# Patient Record
Sex: MALE | Race: WHITE | NOT HISPANIC OR LATINO | URBAN - METROPOLITAN AREA
[De-identification: names, ages, dates, MRNs, and addresses within clinical notes are randomized per-mention and may not be internally consistent; named-entity substitution may affect disease eponyms.]

---

## 2017-04-17 ENCOUNTER — OUTPATIENT (OUTPATIENT)
Dept: OUTPATIENT SERVICES | Facility: HOSPITAL | Age: 62
LOS: 1 days | End: 2017-04-17

## 2017-07-02 ENCOUNTER — EMERGENCY (EMERGENCY)
Facility: HOSPITAL | Age: 62
LOS: 1 days | End: 2017-07-02
Payer: MEDICARE

## 2017-07-02 PROCEDURE — 99284 EMERGENCY DEPT VISIT MOD MDM: CPT

## 2023-04-25 ENCOUNTER — APPOINTMENT (OUTPATIENT)
Dept: CARDIOLOGY | Facility: CLINIC | Age: 68
End: 2023-04-25
Payer: MEDICARE

## 2023-04-25 ENCOUNTER — NON-APPOINTMENT (OUTPATIENT)
Age: 68
End: 2023-04-25

## 2023-04-25 VITALS — DIASTOLIC BLOOD PRESSURE: 70 MMHG | SYSTOLIC BLOOD PRESSURE: 128 MMHG

## 2023-04-25 VITALS
HEART RATE: 65 BPM | BODY MASS INDEX: 31.33 KG/M2 | SYSTOLIC BLOOD PRESSURE: 130 MMHG | WEIGHT: 252 LBS | DIASTOLIC BLOOD PRESSURE: 74 MMHG | HEIGHT: 75 IN | OXYGEN SATURATION: 95 %

## 2023-04-25 DIAGNOSIS — Z83.438 FAMILY HISTORY OF OTHER DISORDER OF LIPOPROTEIN METABOLISM AND OTHER LIPIDEMIA: ICD-10-CM

## 2023-04-25 DIAGNOSIS — T46.6X5A MYALGIA, UNSPECIFIED SITE: ICD-10-CM

## 2023-04-25 DIAGNOSIS — Z82.49 FAMILY HISTORY OF ISCHEMIC HEART DISEASE AND OTHER DISEASES OF THE CIRCULATORY SYSTEM: ICD-10-CM

## 2023-04-25 DIAGNOSIS — Z53.09 PROCEDURE AND TREATMENT NOT CARRIED OUT BECAUSE OF OTHER CONTRAINDICATION: ICD-10-CM

## 2023-04-25 DIAGNOSIS — Z86.16 PERSONAL HISTORY OF COVID-19: ICD-10-CM

## 2023-04-25 DIAGNOSIS — M79.10 MYALGIA, UNSPECIFIED SITE: ICD-10-CM

## 2023-04-25 DIAGNOSIS — S46.219A STRAIN OF MUSCLE, FASCIA AND TENDON OF OTHER PARTS OF BICEPS, UNSPECIFIED ARM, INITIAL ENCOUNTER: ICD-10-CM

## 2023-04-25 DIAGNOSIS — S86.019A STRAIN OF UNSPECIFIED ACHILLES TENDON, INITIAL ENCOUNTER: ICD-10-CM

## 2023-04-25 DIAGNOSIS — Z92.29 PERSONAL HISTORY OF OTHER DRUG THERAPY: ICD-10-CM

## 2023-04-25 DIAGNOSIS — Z78.9 OTHER SPECIFIED HEALTH STATUS: ICD-10-CM

## 2023-04-25 PROCEDURE — 99215 OFFICE O/P EST HI 40 MIN: CPT

## 2023-04-25 PROCEDURE — 93000 ELECTROCARDIOGRAM COMPLETE: CPT

## 2023-04-25 RX ORDER — ATORVASTATIN CALCIUM 80 MG/1
80 TABLET, FILM COATED ORAL
Refills: 0 | Status: DISCONTINUED | COMMUNITY
Start: 2023-04-25 | End: 2023-04-25

## 2023-04-25 RX ORDER — ASPIRIN ENTERIC COATED TABLETS 81 MG 81 MG/1
81 TABLET, DELAYED RELEASE ORAL DAILY
Qty: 90 | Refills: 3 | Status: ACTIVE | COMMUNITY
Start: 2023-04-25

## 2023-04-25 RX ORDER — RELUGOLIX 120 MG/1
120 TABLET, FILM COATED ORAL
Refills: 0 | Status: ACTIVE | COMMUNITY
Start: 2023-04-25

## 2023-04-25 NOTE — REASON FOR VISIT
[Hyperlipidemia] : hyperlipidemia [Hypertension] : hypertension [Coronary Artery Disease] : coronary artery disease [FreeTextEntry1] : I saw this 67-year-old man in follow-up consultation on  04/25/23\par He presented to the hospital on 04/21/23 with an anterior wall myocardial infarction and had stenting of a complex LAD diagonal lesion.  He had a reduced EF, had 48 hours of reperfusion arrhythmias, but seems to have settled down on beta-blockers aspirin statins Brilinta and aspirin.\par He comes in for follow-up with no complaints no groin issues.

## 2023-04-25 NOTE — DISCUSSION/SUMMARY
[FreeTextEntry1] : He will remain on all of his medications, aspirin Brilinta beta-blockers statins.  Because of his past history of statin intolerance I will start the process for prescribing Repatha.  We will follow-up in 1 month.

## 2023-04-25 NOTE — PHYSICAL EXAM
[Well Developed] : well developed [Well Nourished] : well nourished [No Acute Distress] : no acute distress [Normal Conjunctiva] : normal conjunctiva [Normal Venous Pressure] : normal venous pressure [No Carotid Bruit] : no carotid bruit [Normal S1, S2] : normal S1, S2 [No Murmur] : no murmur [No Rub] : no rub [No Gallop] : no gallop [Clear Lung Fields] : clear lung fields [Good Air Entry] : good air entry [No Respiratory Distress] : no respiratory distress  [Soft] : abdomen soft [Non Tender] : non-tender [No Masses/organomegaly] : no masses/organomegaly [Normal Bowel Sounds] : normal bowel sounds [Normal Gait] : normal gait [No Edema] : no edema [No Cyanosis] : no cyanosis [No Clubbing] : no clubbing [No Varicosities] : no varicosities [No Rash] : no rash [No Skin Lesions] : no skin lesions [Moves all extremities] : moves all extremities [No Focal Deficits] : no focal deficits [Normal Speech] : normal speech [Alert and Oriented] : alert and oriented [Normal] : alert and oriented, normal memory [Normal memory] : normal memory

## 2023-04-27 ENCOUNTER — NON-APPOINTMENT (OUTPATIENT)
Age: 68
End: 2023-04-27

## 2023-05-02 ENCOUNTER — NON-APPOINTMENT (OUTPATIENT)
Age: 68
End: 2023-05-02

## 2023-05-03 ENCOUNTER — TRANSCRIPTION ENCOUNTER (OUTPATIENT)
Age: 68
End: 2023-05-03

## 2023-05-04 ENCOUNTER — NON-APPOINTMENT (OUTPATIENT)
Age: 68
End: 2023-05-04

## 2023-05-09 ENCOUNTER — APPOINTMENT (OUTPATIENT)
Dept: UROLOGY | Facility: CLINIC | Age: 68
End: 2023-05-09

## 2023-05-19 ENCOUNTER — NON-APPOINTMENT (OUTPATIENT)
Age: 68
End: 2023-05-19

## 2023-05-20 ENCOUNTER — TRANSCRIPTION ENCOUNTER (OUTPATIENT)
Age: 68
End: 2023-05-20

## 2023-05-22 ENCOUNTER — TRANSCRIPTION ENCOUNTER (OUTPATIENT)
Age: 68
End: 2023-05-22

## 2023-05-23 ENCOUNTER — TRANSCRIPTION ENCOUNTER (OUTPATIENT)
Age: 68
End: 2023-05-23

## 2023-05-30 ENCOUNTER — APPOINTMENT (OUTPATIENT)
Dept: CARDIOLOGY | Facility: CLINIC | Age: 68
End: 2023-05-30
Payer: MEDICARE

## 2023-05-30 VITALS
DIASTOLIC BLOOD PRESSURE: 80 MMHG | HEART RATE: 70 BPM | HEIGHT: 75 IN | SYSTOLIC BLOOD PRESSURE: 126 MMHG | OXYGEN SATURATION: 99 %

## 2023-05-30 PROCEDURE — 99215 OFFICE O/P EST HI 40 MIN: CPT

## 2023-05-30 RX ORDER — ONDANSETRON 8 MG/1
8 TABLET, ORALLY DISINTEGRATING ORAL
Qty: 15 | Refills: 0 | Status: ACTIVE | COMMUNITY
Start: 2023-05-20

## 2023-05-30 NOTE — DISCUSSION/SUMMARY
[FreeTextEntry1] : He will remain on all of his medications, aspirin Brilinta beta-blockers statins.  Because of his past history of statin intolerance I will start the process for prescribing Repatha.  which he now is taking.\par I have scheduled an echo for 2 months from now to reassess LV function and will see him a week after the echo.

## 2023-05-30 NOTE — REASON FOR VISIT
[Hyperlipidemia] : hyperlipidemia [Hypertension] : hypertension [Coronary Artery Disease] : coronary artery disease [FreeTextEntry1] : I saw this 67-year-old man in follow-up consultation on  05/30/23\par He presented to the hospital on 04/21/23 with an anterior wall myocardial infarction and had stenting of a complex LAD diagonal lesion.  He had a reduced EF, had 48 hours of reperfusion arrhythmias, but seems to have settled down on beta-blockers aspirin statins Brilinta and aspirin.\par He comes in for follow-up with no complaints no groin issues.\par However he recently had a readmission because of hematuria was transferred to [facility] for hyperbaric oxygen treatment and currently is home but still having a lot of cystitis and urinary issues.

## 2023-06-07 ENCOUNTER — NON-APPOINTMENT (OUTPATIENT)
Age: 68
End: 2023-06-07

## 2023-06-07 ENCOUNTER — APPOINTMENT (OUTPATIENT)
Dept: UROLOGY | Facility: CLINIC | Age: 68
End: 2023-06-07
Payer: MEDICARE

## 2023-06-07 VITALS
BODY MASS INDEX: 29.84 KG/M2 | DIASTOLIC BLOOD PRESSURE: 74 MMHG | HEIGHT: 75 IN | WEIGHT: 240 LBS | TEMPERATURE: 97.3 F | HEART RATE: 59 BPM | SYSTOLIC BLOOD PRESSURE: 138 MMHG

## 2023-06-07 PROCEDURE — 99213 OFFICE O/P EST LOW 20 MIN: CPT

## 2023-06-07 NOTE — HISTORY OF PRESENT ILLNESS
[FreeTextEntry1] : 67-year-old patient presented to our office to discuss the further steps of the treatment.\par It is his first visit to the urological office but I remember him from the emergent treatment in the [facility].\par Patient has a radical prostatectomy in the past and after the scans the radiation for the locally advanced cancer.\par In [facility] he was admitted because of the gross hematuria.  We did cystoscopy with clot location we found a lot of the lesions that have very characteristic for the post radiation cystitis.\par We treated those lesions with the laser and after was sent patient for the [facility] for the hyperoxygenation chamber\par Today his main compliance on urgency frequency and burning sensation on urination.\par Patient recently had a suspect for the urinary tract infection and was treated with the Cipro 500 mg twice a day.

## 2023-06-07 NOTE — PHYSICAL EXAM
[General Appearance - Well Developed] : well developed [General Appearance - Well Nourished] : well nourished [Normal Appearance] : normal appearance [Well Groomed] : well groomed [General Appearance - In No Acute Distress] : no acute distress [Edema] : no peripheral edema [Respiration, Rhythm And Depth] : normal respiratory rhythm and effort [Exaggerated Use Of Accessory Muscles For Inspiration] : no accessory muscle use [Abdomen Soft] : soft [Abdomen Tenderness] : non-tender [Costovertebral Angle Tenderness] : no ~M costovertebral angle tenderness [Urethral Meatus] : meatus normal [Urinary Bladder Findings] : the bladder was normal on palpation [Scrotum] : the scrotum was normal [Testes Mass (___cm)] : there were no testicular masses [Normal Station and Gait] : the gait and station were normal for the patient's age [No Focal Deficits] : no focal deficits [] : no rash [Oriented To Time, Place, And Person] : oriented to person, place, and time [Affect] : the affect was normal [Mood] : the mood was normal [Not Anxious] : not anxious [No Palpable Adenopathy] : no palpable adenopathy

## 2023-06-07 NOTE — ASSESSMENT
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  See patients in 4 weeks.

## 2023-06-22 ENCOUNTER — APPOINTMENT (OUTPATIENT)
Dept: UROLOGY | Facility: CLINIC | Age: 68
End: 2023-06-22
Payer: MEDICARE

## 2023-06-22 VITALS
DIASTOLIC BLOOD PRESSURE: 69 MMHG | HEIGHT: 75 IN | BODY MASS INDEX: 29.84 KG/M2 | RESPIRATION RATE: 16 BRPM | TEMPERATURE: 98.1 F | HEART RATE: 73 BPM | WEIGHT: 240 LBS | SYSTOLIC BLOOD PRESSURE: 107 MMHG

## 2023-06-22 DIAGNOSIS — Z97.8 PRESENCE OF OTHER SPECIFIED DEVICES: ICD-10-CM

## 2023-06-22 DIAGNOSIS — Z87.898 PERSONAL HISTORY OF OTHER SPECIFIED CONDITIONS: ICD-10-CM

## 2023-06-22 PROCEDURE — 99212 OFFICE O/P EST SF 10 MIN: CPT

## 2023-06-22 NOTE — HISTORY OF PRESENT ILLNESS
[FreeTextEntry1] : 67-year-old patient presented with a Tate catheter. \par We know patient from the previous visits. \par I saw patient in the hospital and did that cystoscopy and laser ablation of the bleeding lesions.  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  The Tate catheter was inserted and today he presented with the Tate.

## 2023-06-22 NOTE — ASSESSMENT
[FreeTextEntry1] : Discussed with the patient's option to take out the Tate catheter today.\par He preferred to stay with the Tate cath until Monday because of the severe urge incontinence when the catheter is out\par See him on Monday

## 2023-06-22 NOTE — PHYSICAL EXAM
[General Appearance - Well Developed] : well developed [General Appearance - Well Nourished] : well nourished [Normal Appearance] : normal appearance [Well Groomed] : well groomed [General Appearance - In No Acute Distress] : no acute distress [Abdomen Soft] : soft [Abdomen Tenderness] : non-tender [Costovertebral Angle Tenderness] : no ~M costovertebral angle tenderness [Urethral Meatus] : meatus normal [Urinary Bladder Findings] : the bladder was normal on palpation [Scrotum] : the scrotum was normal [Testes Mass (___cm)] : there were no testicular masses [FreeTextEntry1] : Tate cath in the bladder draining the yellow urine [Edema] : no peripheral edema [] : no respiratory distress [Respiration, Rhythm And Depth] : normal respiratory rhythm and effort [Exaggerated Use Of Accessory Muscles For Inspiration] : no accessory muscle use [Oriented To Time, Place, And Person] : oriented to person, place, and time [Affect] : the affect was normal [Mood] : the mood was normal [Not Anxious] : not anxious [Normal Station and Gait] : the gait and station were normal for the patient's age [No Focal Deficits] : no focal deficits [No Palpable Adenopathy] : no palpable adenopathy

## 2023-06-26 ENCOUNTER — APPOINTMENT (OUTPATIENT)
Dept: UROLOGY | Facility: CLINIC | Age: 68
End: 2023-06-26

## 2023-07-08 ENCOUNTER — INPATIENT (INPATIENT)
Facility: HOSPITAL | Age: 68
LOS: 5 days | Discharge: HOME | DRG: 669 | End: 2023-07-14
Attending: STUDENT IN AN ORGANIZED HEALTH CARE EDUCATION/TRAINING PROGRAM | Admitting: STUDENT IN AN ORGANIZED HEALTH CARE EDUCATION/TRAINING PROGRAM
Payer: MEDICARE

## 2023-07-08 VITALS
SYSTOLIC BLOOD PRESSURE: 121 MMHG | DIASTOLIC BLOOD PRESSURE: 79 MMHG | TEMPERATURE: 98 F | OXYGEN SATURATION: 100 % | RESPIRATION RATE: 19 BRPM | HEART RATE: 65 BPM

## 2023-07-08 DIAGNOSIS — R31.9 HEMATURIA, UNSPECIFIED: ICD-10-CM

## 2023-07-08 DIAGNOSIS — I25.10 ATHEROSCLEROTIC HEART DISEASE OF NATIVE CORONARY ARTERY WITHOUT ANGINA PECTORIS: ICD-10-CM

## 2023-07-08 DIAGNOSIS — Z90.49 ACQUIRED ABSENCE OF OTHER SPECIFIED PARTS OF DIGESTIVE TRACT: Chronic | ICD-10-CM

## 2023-07-08 DIAGNOSIS — Z98.890 OTHER SPECIFIED POSTPROCEDURAL STATES: Chronic | ICD-10-CM

## 2023-07-08 DIAGNOSIS — D62 ACUTE POSTHEMORRHAGIC ANEMIA: ICD-10-CM

## 2023-07-08 DIAGNOSIS — I10 ESSENTIAL (PRIMARY) HYPERTENSION: ICD-10-CM

## 2023-07-08 DIAGNOSIS — N30.40 IRRADIATION CYSTITIS WITHOUT HEMATURIA: ICD-10-CM

## 2023-07-08 LAB
ALBUMIN SERPL ELPH-MCNC: 4 G/DL — SIGNIFICANT CHANGE UP (ref 3.3–5.2)
ALP SERPL-CCNC: 124 U/L — HIGH (ref 40–120)
ALT FLD-CCNC: 20 U/L — SIGNIFICANT CHANGE UP
ANION GAP SERPL CALC-SCNC: 14 MMOL/L — SIGNIFICANT CHANGE UP (ref 5–17)
AST SERPL-CCNC: 23 U/L — SIGNIFICANT CHANGE UP
BILIRUB SERPL-MCNC: 0.5 MG/DL — SIGNIFICANT CHANGE UP (ref 0.4–2)
BUN SERPL-MCNC: 16.9 MG/DL — SIGNIFICANT CHANGE UP (ref 8–20)
CALCIUM SERPL-MCNC: 9.5 MG/DL — SIGNIFICANT CHANGE UP (ref 8.4–10.5)
CHLORIDE SERPL-SCNC: 101 MMOL/L — SIGNIFICANT CHANGE UP (ref 96–108)
CO2 SERPL-SCNC: 20 MMOL/L — LOW (ref 22–29)
CREAT SERPL-MCNC: 1.03 MG/DL — SIGNIFICANT CHANGE UP (ref 0.5–1.3)
EGFR: 80 ML/MIN/1.73M2 — SIGNIFICANT CHANGE UP
GLUCOSE SERPL-MCNC: 110 MG/DL — HIGH (ref 70–99)
HCT VFR BLD CALC: 36.4 % — LOW (ref 39–50)
HGB BLD-MCNC: 12.3 G/DL — LOW (ref 13–17)
INR BLD: 1.13 RATIO — SIGNIFICANT CHANGE UP (ref 0.88–1.16)
MCHC RBC-ENTMCNC: 31.6 PG — SIGNIFICANT CHANGE UP (ref 27–34)
MCHC RBC-ENTMCNC: 33.8 GM/DL — SIGNIFICANT CHANGE UP (ref 32–36)
MCV RBC AUTO: 93.6 FL — SIGNIFICANT CHANGE UP (ref 80–100)
PLATELET # BLD AUTO: 337 K/UL — SIGNIFICANT CHANGE UP (ref 150–400)
POTASSIUM SERPL-MCNC: 4.1 MMOL/L — SIGNIFICANT CHANGE UP (ref 3.5–5.3)
POTASSIUM SERPL-SCNC: 4.1 MMOL/L — SIGNIFICANT CHANGE UP (ref 3.5–5.3)
PROT SERPL-MCNC: 6.6 G/DL — SIGNIFICANT CHANGE UP (ref 6.6–8.7)
PROTHROM AB SERPL-ACNC: 13.1 SEC — SIGNIFICANT CHANGE UP (ref 10.5–13.4)
RBC # BLD: 3.89 M/UL — LOW (ref 4.2–5.8)
RBC # FLD: 13.2 % — SIGNIFICANT CHANGE UP (ref 10.3–14.5)
SODIUM SERPL-SCNC: 135 MMOL/L — SIGNIFICANT CHANGE UP (ref 135–145)
WBC # BLD: 8.96 K/UL — SIGNIFICANT CHANGE UP (ref 3.8–10.5)
WBC # FLD AUTO: 8.96 K/UL — SIGNIFICANT CHANGE UP (ref 3.8–10.5)

## 2023-07-08 PROCEDURE — G0316 PROLONG INPT EVAL ADD15 M: CPT

## 2023-07-08 PROCEDURE — 99223 1ST HOSP IP/OBS HIGH 75: CPT

## 2023-07-08 RX ORDER — GABAPENTIN 400 MG/1
1 CAPSULE ORAL
Refills: 0 | DISCHARGE

## 2023-07-08 RX ORDER — TICAGRELOR 90 MG/1
60 TABLET ORAL EVERY 12 HOURS
Refills: 0 | Status: DISCONTINUED | OUTPATIENT
Start: 2023-07-08 | End: 2023-07-14

## 2023-07-08 RX ORDER — ASPIRIN/CALCIUM CARB/MAGNESIUM 324 MG
81 TABLET ORAL DAILY
Refills: 0 | Status: DISCONTINUED | OUTPATIENT
Start: 2023-07-08 | End: 2023-07-14

## 2023-07-08 RX ORDER — LOSARTAN POTASSIUM 100 MG/1
25 TABLET, FILM COATED ORAL DAILY
Refills: 0 | Status: DISCONTINUED | OUTPATIENT
Start: 2023-07-08 | End: 2023-07-14

## 2023-07-08 RX ORDER — ASPIRIN/CALCIUM CARB/MAGNESIUM 324 MG
1 TABLET ORAL
Refills: 0 | DISCHARGE

## 2023-07-08 RX ORDER — LOSARTAN POTASSIUM 100 MG/1
1 TABLET, FILM COATED ORAL
Refills: 0 | DISCHARGE

## 2023-07-08 RX ORDER — PENTOSAN POLYSULFATE SODIUM 100 MG
1 CAPSULE ORAL
Refills: 0 | DISCHARGE

## 2023-07-08 RX ORDER — TROSPIUM CHLORIDE 20 MG/1
1 TABLET, FILM COATED ORAL
Refills: 0 | DISCHARGE

## 2023-07-08 RX ORDER — TAMSULOSIN HYDROCHLORIDE 0.4 MG/1
0.4 CAPSULE ORAL AT BEDTIME
Refills: 0 | Status: DISCONTINUED | OUTPATIENT
Start: 2023-07-08 | End: 2023-07-14

## 2023-07-08 RX ORDER — GABAPENTIN 400 MG/1
300 CAPSULE ORAL THREE TIMES A DAY
Refills: 0 | Status: DISCONTINUED | OUTPATIENT
Start: 2023-07-08 | End: 2023-07-14

## 2023-07-08 RX ORDER — SPIRONOLACTONE 25 MG/1
25 TABLET, FILM COATED ORAL DAILY
Refills: 0 | Status: DISCONTINUED | OUTPATIENT
Start: 2023-07-08 | End: 2023-07-14

## 2023-07-08 RX ORDER — METOPROLOL TARTRATE 50 MG
100 TABLET ORAL DAILY
Refills: 0 | Status: DISCONTINUED | OUTPATIENT
Start: 2023-07-08 | End: 2023-07-14

## 2023-07-08 RX ORDER — SPIRONOLACTONE 25 MG/1
1 TABLET, FILM COATED ORAL
Refills: 0 | DISCHARGE

## 2023-07-08 RX ORDER — RELUGOLIX 120 MG/1
1 TABLET, FILM COATED ORAL
Refills: 0 | DISCHARGE

## 2023-07-08 RX ADMIN — TICAGRELOR 60 MILLIGRAM(S): 90 TABLET ORAL at 17:20

## 2023-07-08 RX ADMIN — Medication 100 MILLIGRAM(S): at 15:03

## 2023-07-08 RX ADMIN — TAMSULOSIN HYDROCHLORIDE 0.4 MILLIGRAM(S): 0.4 CAPSULE ORAL at 21:37

## 2023-07-08 RX ADMIN — GABAPENTIN 300 MILLIGRAM(S): 400 CAPSULE ORAL at 15:03

## 2023-07-08 RX ADMIN — GABAPENTIN 300 MILLIGRAM(S): 400 CAPSULE ORAL at 21:37

## 2023-07-08 NOTE — PATIENT PROFILE ADULT - FALL HARM RISK - RISK INTERVENTIONS
Assistance OOB with selected safe patient handling equipment/Assistance with ambulation/Communicate Fall Risk and Risk Factors to all staff, patient, and family/Monitor for mental status changes/Monitor gait and stability/Reinforce activity limits and safety measures with patient and family/Bed in lowest position, wheels locked, appropriate side rails in place/Call bell, personal items and telephone in reach/Instruct patient to call for assistance before getting out of bed or chair/Non-slip footwear when patient is out of bed/[place] to call system/Physically safe environment - no spills, clutter or unnecessary equipment/Purposeful Proactive Rounding/Room/bathroom lighting operational, light cord in reach

## 2023-07-08 NOTE — H&P ADULT - ASSESSMENT
68 year old male with PMH HTN, CAD s/p PCI and prostate cancer s/p resection and XRT was transferred from [facility] for further care. As per patient he has been having hematuria since end April-beginning May when he presented to [facility] and then transferred to [facility] for hyperbaric Oxygen treatment. Intermittent bleeding and hospitalizations for hematuria and urinary retention. Received IV iron, no transfusions. Currently just having hematuria.  Denies any abdominal pain, nausea, vomiting, chest pain, dyspnea or palpitations      Hematuria  Radiation Cystitis  Anemia due to  blood loss  - Admit to medical bed  - Maintain Tate  - CBI ordered  - Tamsulosin  - Monitor Hgb  - Urology consulted    CAD with prior PCI (April 2023)  HTN  -  DAPT;   - BB, ARB     68 year old male with PMH HTN, CAD s/p PCI and prostate cancer s/p resection and XRT was transferred from [facility] for further care. As per patient he has been having hematuria since end April-beginning May when he presented to [facility] and then transferred to [facility] for hyperbaric Oxygen treatment. Intermittent bleeding and hospitalizations for hematuria and urinary retention. Received IV iron, no transfusions. Currently just having hematuria.  Denies any abdominal pain, nausea, vomiting, chest pain, dyspnea or palpitations      Hematuria  Radiation Cystitis  Anemia due to  blood loss  - Admit to medical bed  - Maintain Tate  - CBI ordered  - Tamsulosin  - Monitor Hgb  - Urology consulted    CAD with prior PCI (April 2023)  HTN  -  DAPT; patient had been advised to continue ASA and stop Brilinta but patient doesnt want to stop as per documentation from Cardiology at [place]  - BB, ARB    VTE Prophylaxis  - VCD    Discussed with Urology ABAD Palma   68 year old male with PMH HTN, CAD s/p PCI and prostate cancer s/p resection and XRT was transferred from [facility] for further care. As per patient he has been having hematuria since end April-beginning May when he presented to [facility] and then transferred to [facility] for hyperbaric Oxygen treatment. Intermittent bleeding and hospitalizations for hematuria and urinary retention. Received IV iron, no transfusions. Currently just having hematuria.  Denies any abdominal pain, nausea, vomiting, chest pain, dyspnea or palpitations      Hematuria  Radiation Cystitis  Anemia due to  blood loss  - Admit to medical bed  - Maintain Tate  - CBI ordered  - Tamsulosin  - Monitor Hgb; Check Iron stores  - Urology consulted    CAD with prior PCI (April 2023)  HTN  Currently asymptomatic.  - Monitor  -  DAPT; patient had been advised to continue ASA and stop Brilinta but patient doesnt want to stop as per documentation from Cardiology at [place]  - BB, ARB  - Follow up DAPT discussion with patient     VTE Prophylaxis  - VCD. No anticoagulation due to hematuria  Mobilize OOBTC  Telemetry x 24 hours then need based    May need cystoscopy inevitably. Discussed with Urology LANNY Palma   68 year old male with PMH HTN, CAD s/p PCI and prostate cancer s/p resection and XRT was transferred from [facility] for further care. As per patient he has been having hematuria since end April-beginning May when he presented to [facility] and then transferred to [facility] for hyperbaric Oxygen treatment. Intermittent bleeding and hospitalizations for hematuria and urinary retention. Received IV iron, no transfusions. Currently just having hematuria.  Denies any abdominal pain, nausea, vomiting, chest pain, dyspnea or palpitations      Hematuria  Radiation Cystitis  Anemia due to  blood loss  - Admit to medical bed  - Maintain Tate  - CBI ordered  - Trospium; patient to get own.   - Gabapentin to be continued  - Tamsulosin  - Monitor Hgb; Check Iron stores  - Urology consulted    CAD with prior PCI (April 2023)  HTN  Currently asymptomatic.  - Monitor  -  DAPT; patient had been advised to continue ASA and stop Brilinta but patient doesn't want to stop.  - BB, ARB  - Follow up DAPT discussion with patient     VTE Prophylaxis  - VCD. No anticoagulation due to hematuria  Mobilize OOBTC  Telemetry x 24 hours then need based    May need cystoscopy inevitably. Discussed with Urology LANNY Palma

## 2023-07-08 NOTE — CONSULT NOTE ADULT - NS ATTEND AMEND GEN_ALL_CORE FT
The patient had a RALP.  PSA was rising and he had radiation a year later.   He is on hormonal therapy for a couple of years.   Had 40 hyperbaric treatments Persistent gross hematuria.     Conservative management.

## 2023-07-08 NOTE — H&P ADULT - NSHPPHYSICALEXAM_GEN_ALL_CORE
OBJECTIVE:  Vital Signs Last 24 Hrs  T(C): 36.7 (08 Jul 2023 11:36), Max: 36.7 (08 Jul 2023 11:36)  T(F): 98.1 (08 Jul 2023 11:36), Max: 98.1 (08 Jul 2023 11:36)  HR: 65 (08 Jul 2023 11:36) (65 - 65)  BP: 121/79 (08 Jul 2023 11:36) (121/79 - 121/79)  BP(mean): --  RR: 19 (08 Jul 2023 11:36) (19 - 19)  SpO2: 100% (08 Jul 2023 11:36) (100% - 100%)        PHYSICAL EXAMINATION  General: Middle aged male lying in bed comfortably  HEENT:  Anicteric sclera  NECK:  Supple  CVS: regular rate and rhythm S1 S2  RESP:  CTAB  GI:  Soft nondistended nontender BS+  : Tate with bloody urine  MSK:  FROM  CNS:  Aox3. No gross focal or global deficit appreciated  INTEG:  warm dry skin  PSYCH:  Fair Mood

## 2023-07-08 NOTE — H&P ADULT - HISTORY OF PRESENT ILLNESS
68 year old male with PMH HTN, CAD s/p PCI and prostate cancer s/p resection and XRT was transferred from [facility] for further care. As per patient he has been having hematuria since end April-beginning May when he presented to [facility] and then transferred to [facility] for hyperbaric Oxygen treatment. Intermittent bleeding and hospitalizations for hematuria and urinary retention. Received IV iron, no transfusions. Currently just having hematuria.  Denies any abdominal pain, nausea, vomiting, chest pain, dyspnea or palpitations

## 2023-07-08 NOTE — H&P ADULT - NSICDXFAMILYHX_GEN_ALL_CORE_FT
FAMILY HISTORY:  Father  Still living? Unknown  FH: CAD (coronary artery disease), Age at diagnosis: Age Unknown    Mother  Still living? Unknown  FH: CAD (coronary artery disease), Age at diagnosis: Age Unknown    Sibling  Still living? Unknown  FH: CAD (coronary artery disease), Age at diagnosis: Age Unknown

## 2023-07-09 LAB
ANION GAP SERPL CALC-SCNC: 13 MMOL/L — SIGNIFICANT CHANGE UP (ref 5–17)
BUN SERPL-MCNC: 19 MG/DL — SIGNIFICANT CHANGE UP (ref 8–20)
CALCIUM SERPL-MCNC: 9 MG/DL — SIGNIFICANT CHANGE UP (ref 8.4–10.5)
CHLORIDE SERPL-SCNC: 105 MMOL/L — SIGNIFICANT CHANGE UP (ref 96–108)
CO2 SERPL-SCNC: 23 MMOL/L — SIGNIFICANT CHANGE UP (ref 22–29)
CREAT SERPL-MCNC: 1.04 MG/DL — SIGNIFICANT CHANGE UP (ref 0.5–1.3)
EGFR: 79 ML/MIN/1.73M2 — SIGNIFICANT CHANGE UP
FERRITIN SERPL-MCNC: 1141 NG/ML — HIGH (ref 30–400)
GLUCOSE SERPL-MCNC: 98 MG/DL — SIGNIFICANT CHANGE UP (ref 70–99)
HCT VFR BLD CALC: 35.1 % — LOW (ref 39–50)
HCV AB S/CO SERPL IA: 0.25 S/CO — SIGNIFICANT CHANGE UP (ref 0–0.99)
HCV AB SERPL-IMP: SIGNIFICANT CHANGE UP
HGB BLD-MCNC: 11.6 G/DL — LOW (ref 13–17)
IRON SATN MFR SERPL: 22 % — SIGNIFICANT CHANGE UP (ref 16–55)
IRON SATN MFR SERPL: 70 UG/DL — SIGNIFICANT CHANGE UP (ref 59–158)
MCHC RBC-ENTMCNC: 31.2 PG — SIGNIFICANT CHANGE UP (ref 27–34)
MCHC RBC-ENTMCNC: 33 GM/DL — SIGNIFICANT CHANGE UP (ref 32–36)
MCV RBC AUTO: 94.4 FL — SIGNIFICANT CHANGE UP (ref 80–100)
PLATELET # BLD AUTO: 260 K/UL — SIGNIFICANT CHANGE UP (ref 150–400)
POTASSIUM SERPL-MCNC: 4.2 MMOL/L — SIGNIFICANT CHANGE UP (ref 3.5–5.3)
POTASSIUM SERPL-SCNC: 4.2 MMOL/L — SIGNIFICANT CHANGE UP (ref 3.5–5.3)
RBC # BLD: 3.72 M/UL — LOW (ref 4.2–5.8)
RBC # FLD: 13.3 % — SIGNIFICANT CHANGE UP (ref 10.3–14.5)
SODIUM SERPL-SCNC: 141 MMOL/L — SIGNIFICANT CHANGE UP (ref 135–145)
TIBC SERPL-MCNC: 322 UG/DL — SIGNIFICANT CHANGE UP (ref 220–430)
TRANSFERRIN SERPL-MCNC: 225 MG/DL — SIGNIFICANT CHANGE UP (ref 180–329)
WBC # BLD: 6.34 K/UL — SIGNIFICANT CHANGE UP (ref 3.8–10.5)
WBC # FLD AUTO: 6.34 K/UL — SIGNIFICANT CHANGE UP (ref 3.8–10.5)

## 2023-07-09 PROCEDURE — 99232 SBSQ HOSP IP/OBS MODERATE 35: CPT | Mod: FS

## 2023-07-09 PROCEDURE — 99233 SBSQ HOSP IP/OBS HIGH 50: CPT

## 2023-07-09 RX ADMIN — TICAGRELOR 60 MILLIGRAM(S): 90 TABLET ORAL at 17:19

## 2023-07-09 RX ADMIN — GABAPENTIN 300 MILLIGRAM(S): 400 CAPSULE ORAL at 22:23

## 2023-07-09 RX ADMIN — Medication 100 MILLIGRAM(S): at 13:28

## 2023-07-09 RX ADMIN — SPIRONOLACTONE 25 MILLIGRAM(S): 25 TABLET, FILM COATED ORAL at 05:17

## 2023-07-09 RX ADMIN — LOSARTAN POTASSIUM 25 MILLIGRAM(S): 100 TABLET, FILM COATED ORAL at 05:18

## 2023-07-09 RX ADMIN — Medication 81 MILLIGRAM(S): at 12:09

## 2023-07-09 RX ADMIN — TAMSULOSIN HYDROCHLORIDE 0.4 MILLIGRAM(S): 0.4 CAPSULE ORAL at 22:23

## 2023-07-09 RX ADMIN — GABAPENTIN 300 MILLIGRAM(S): 400 CAPSULE ORAL at 13:28

## 2023-07-09 RX ADMIN — TICAGRELOR 60 MILLIGRAM(S): 90 TABLET ORAL at 05:18

## 2023-07-09 RX ADMIN — GABAPENTIN 300 MILLIGRAM(S): 400 CAPSULE ORAL at 05:17

## 2023-07-09 NOTE — PROGRESS NOTE ADULT - ASSESSMENT
68 year old male with PMH HTN, CAD s/p PCI and prostate cancer s/p resection and XRT was transferred from [facility] for further care. As per patient he has been having hematuria since end April-beginning May when he presented to [facility] and then transferred to [facility] for hyperbaric Oxygen treatment. Intermittent bleeding and hospitalizations for hematuria and urinary retention.     Radiation cystitis causing acute blood loss anemia  - CBI ordered, urine is clearing up   - Trospium 20 mg BID  - Gabapentin 300 TID  - Tamsulosin 0.4  - Continue Chemo medication Relugolix   - Monitor Hgb   - Urology consult appreciated   -Will order CT scan abdomen and Pelvis w/ IV contrast to evaluate     CAD with prior PCI (April 2023)  HTN  -  DAPT; patient had been advised to continue ASA and Brilinta at lower dose    Toprol   Losartan 25 QD  Aldactone 25 QD     VTE Prophylaxis  - VCD. No anticoagulation due to hematuria  Mobilize OOBTC

## 2023-07-09 NOTE — PROGRESS NOTE ADULT - SUBJECTIVE AND OBJECTIVE BOX
[facility] Division of Hospital Medicine    Chief Complaint:  Hematuria     SUBJECTIVE / OVERNIGHT EVENTS:  Placed on CBI     Patient denies chest pain, SOB, abd pain, N/V, fever, chills, dysuria or any other complaints. All remainder ROS negative.     MEDICATIONS  (STANDING):  aspirin enteric coated 81 milliGRAM(s) Oral daily  gabapentin 300 milliGRAM(s) Oral three times a day  losartan 25 milliGRAM(s) Oral daily  metoprolol succinate  milliGRAM(s) Oral daily  RELUGOLIX (ORGOVYX) 120 milliGRAM(s) 1 Tablet(s) Oral daily  spironolactone 25 milliGRAM(s) Oral daily  tamsulosin 0.4 milliGRAM(s) Oral at bedtime  ticagrelor 60 milliGRAM(s) Oral every 12 hours  Trospium 20 MICROGram(s) 1 Tablet(s) Oral two times a day    MEDICATIONS  (PRN):        I&O's Summary    08 Jul 2023 07:01  -  09 Jul 2023 07:00  --------------------------------------------------------  IN: 740 mL / OUT: 360 mL / NET: 380 mL        PHYSICAL EXAM:  Vital Signs Last 24 Hrs  T(C): 36.8 (09 Jul 2023 12:07), Max: 36.8 (08 Jul 2023 17:13)  T(F): 98.3 (09 Jul 2023 12:07), Max: 98.3 (08 Jul 2023 17:13)  HR: 64 (09 Jul 2023 12:07) (64 - 73)  BP: 99/62 (09 Jul 2023 12:07) (98/62 - 129/71)  BP(mean): --  RR: 18 (09 Jul 2023 12:07) (18 - 18)  SpO2: 97% (09 Jul 2023 12:07) (96% - 98%)    Parameters below as of 09 Jul 2023 12:07  Patient On (Oxygen Delivery Method): room air            CONSTITUTIONAL: NAD,   ENMT: Moist oral mucosa, no pharyngeal injection or exudates; normal dentition  RESPIRATORY: Normal respiratory effort; lungs are clear to auscultation bilaterally  CARDIOVASCULAR: Regular rate and rhythm, normal S1 and S2, no murmur/   ABDOMEN: Nontender to palpation, normoactive bowel sounds, no rebound/guarding; No hepatosplenomegaly  MUSCLOSKELETAL:  Normal gait; no clubbing or cyanosis of digits; no joint swelling or tenderness to palpation  PSYCH: A+O to person, place, and time; affect appropriate  NEUROLOGY: CN 2-12 are intact and symmetric; no gross sensory deficits;   SKIN: No rashes; no palpable lesions    LABS:                        11.6   6.34  )-----------( 260      ( 09 Jul 2023 06:40 )             35.1     07-09    141  |  105  |  19.0  ----------------------------<  98  4.2   |  23.0  |  1.04    Ca    9.0      09 Jul 2023 06:40    TPro  6.6  /  Alb  4.0  /  TBili  0.5  /  DBili  x   /  AST  23  /  ALT  20  /  AlkPhos  124<H>  07-08    PT/INR - ( 08 Jul 2023 16:44 )   PT: 13.1 sec;   INR: 1.13 ratio               Urinalysis Basic - ( 09 Jul 2023 06:40 )    Color: x / Appearance: x / SG: x / pH: x  Gluc: 98 mg/dL / Ketone: x  / Bili: x / Urobili: x   Blood: x / Protein: x / Nitrite: x   Leuk Esterase: x / RBC: x / WBC x   Sq Epi: x / Non Sq Epi: x / Bacteria: x        CAPILLARY BLOOD GLUCOSE            RADIOLOGY & ADDITIONAL TESTS:  Results Reviewed:   Imaging Personally Reviewed:  Electrocardiogram Personally Reviewed:

## 2023-07-09 NOTE — PROGRESS NOTE ADULT - SUBJECTIVE AND OBJECTIVE BOX
Subjective: Patient seen and examined at bedside.         MEDICATIONS  (STANDING):  aspirin enteric coated 81 milliGRAM(s) Oral daily  gabapentin 300 milliGRAM(s) Oral three times a day  losartan 25 milliGRAM(s) Oral daily  metoprolol succinate  milliGRAM(s) Oral daily  spironolactone 25 milliGRAM(s) Oral daily  tamsulosin 0.4 milliGRAM(s) Oral at bedtime  ticagrelor 60 milliGRAM(s) Oral every 12 hours  Trospium 20 MICROGram(s) 1 Tablet(s) Oral two times a day    MEDICATIONS  (PRN):      Vital Signs Last 24 Hrs  T(C): 36.6 (09 Jul 2023 04:31), Max: 36.8 (08 Jul 2023 17:13)  T(F): 97.8 (09 Jul 2023 04:31), Max: 98.3 (08 Jul 2023 17:13)  HR: 73 (09 Jul 2023 04:31) (65 - 73)  BP: 129/71 (09 Jul 2023 04:31) (98/62 - 129/71)  BP(mean): --  RR: 18 (09 Jul 2023 04:31) (18 - 19)  SpO2: 96% (09 Jul 2023 04:31) (96% - 100%)  Parameters below as of 09 Jul 2023 04:31  Patient On (Oxygen Delivery Method): room air    Physical Exam:  General: NAD  HEENT: PERRL, EOMI  Neck: No JVD, FROM without pain  Pulm: Respirations non labored, no accessory muscle use  Abdomen: Soft, NT/ND, without rebound or guarding  Neuro: Alert and oriented x3, no focal deficits  : 3 way doyle in place with CBI running, doyle with light pink urine, old clots seen in bag     LABS:                      A: Patient is a 67 yo M with extensive hx of prostate ca, transfer from [place] with hematuria, likely post radiation cystitis, CBI is still running.     Plan:   Continue CBI, can titrate as urine clears   Trend h/h   Possible cysto in future   cont conservative management   rest of care per primary team    Subjective: Patient seen and examined at bedside.         MEDICATIONS  (STANDING):  aspirin enteric coated 81 milliGRAM(s) Oral daily  gabapentin 300 milliGRAM(s) Oral three times a day  losartan 25 milliGRAM(s) Oral daily  metoprolol succinate  milliGRAM(s) Oral daily  spironolactone 25 milliGRAM(s) Oral daily  tamsulosin 0.4 milliGRAM(s) Oral at bedtime  ticagrelor 60 milliGRAM(s) Oral every 12 hours  Trospium 20 MICROGram(s) 1 Tablet(s) Oral two times a day    MEDICATIONS  (PRN):      Vital Signs Last 24 Hrs  T(C): 36.6 (09 Jul 2023 04:31), Max: 36.8 (08 Jul 2023 17:13)  T(F): 97.8 (09 Jul 2023 04:31), Max: 98.3 (08 Jul 2023 17:13)  HR: 73 (09 Jul 2023 04:31) (65 - 73)  BP: 129/71 (09 Jul 2023 04:31) (98/62 - 129/71)  BP(mean): --  RR: 18 (09 Jul 2023 04:31) (18 - 19)  SpO2: 96% (09 Jul 2023 04:31) (96% - 100%)  Parameters below as of 09 Jul 2023 04:31  Patient On (Oxygen Delivery Method): room air    Physical Exam:  General: NAD  HEENT: PERRL, EOMI  Neck: No JVD, FROM without pain  Pulm: Respirations non labored, no accessory muscle use  Abdomen: Soft, NT/ND, without rebound or guarding  Neuro: Alert and oriented x3, no focal deficits  : 3 way doyle in place with CBI running, doyle with light pink urine, old clots seen in bag     LABS:                      A: Patient is a 69 yo M with extensive hx of prostate ca, transfer from [place] with hematuria, likely post radiation cystitis, CBI is still running.   has pain when he is moving around    Plan:   Continue CBI, can titrate as urine clears   Trend h/h   CT scan pending   will eventually need cysto.   Fi large amount of clot noted then he will likely need a cysto and clot evacuation.

## 2023-07-10 LAB
ANION GAP SERPL CALC-SCNC: 12 MMOL/L — SIGNIFICANT CHANGE UP (ref 5–17)
BUN SERPL-MCNC: 20.5 MG/DL — HIGH (ref 8–20)
CALCIUM SERPL-MCNC: 8.9 MG/DL — SIGNIFICANT CHANGE UP (ref 8.4–10.5)
CHLORIDE SERPL-SCNC: 105 MMOL/L — SIGNIFICANT CHANGE UP (ref 96–108)
CO2 SERPL-SCNC: 21 MMOL/L — LOW (ref 22–29)
CREAT SERPL-MCNC: 0.96 MG/DL — SIGNIFICANT CHANGE UP (ref 0.5–1.3)
EGFR: 87 ML/MIN/1.73M2 — SIGNIFICANT CHANGE UP
GLUCOSE SERPL-MCNC: 104 MG/DL — HIGH (ref 70–99)
HCT VFR BLD CALC: 35.5 % — LOW (ref 39–50)
HGB BLD-MCNC: 11.8 G/DL — LOW (ref 13–17)
MCHC RBC-ENTMCNC: 31.6 PG — SIGNIFICANT CHANGE UP (ref 27–34)
MCHC RBC-ENTMCNC: 33.2 GM/DL — SIGNIFICANT CHANGE UP (ref 32–36)
MCV RBC AUTO: 95.2 FL — SIGNIFICANT CHANGE UP (ref 80–100)
PLATELET # BLD AUTO: 249 K/UL — SIGNIFICANT CHANGE UP (ref 150–400)
POTASSIUM SERPL-MCNC: 4.1 MMOL/L — SIGNIFICANT CHANGE UP (ref 3.5–5.3)
POTASSIUM SERPL-SCNC: 4.1 MMOL/L — SIGNIFICANT CHANGE UP (ref 3.5–5.3)
RBC # BLD: 3.73 M/UL — LOW (ref 4.2–5.8)
RBC # FLD: 13.2 % — SIGNIFICANT CHANGE UP (ref 10.3–14.5)
SODIUM SERPL-SCNC: 138 MMOL/L — SIGNIFICANT CHANGE UP (ref 135–145)
WBC # BLD: 5.3 K/UL — SIGNIFICANT CHANGE UP (ref 3.8–10.5)
WBC # FLD AUTO: 5.3 K/UL — SIGNIFICANT CHANGE UP (ref 3.8–10.5)

## 2023-07-10 PROCEDURE — 99233 SBSQ HOSP IP/OBS HIGH 50: CPT

## 2023-07-10 PROCEDURE — 74177 CT ABD & PELVIS W/CONTRAST: CPT | Mod: 26

## 2023-07-10 RX ORDER — HYDROMORPHONE HYDROCHLORIDE 2 MG/ML
2 INJECTION INTRAMUSCULAR; INTRAVENOUS; SUBCUTANEOUS EVERY 6 HOURS
Refills: 0 | Status: DISCONTINUED | OUTPATIENT
Start: 2023-07-10 | End: 2023-07-14

## 2023-07-10 RX ORDER — OXYCODONE HYDROCHLORIDE 5 MG/1
5 TABLET ORAL EVERY 6 HOURS
Refills: 0 | Status: DISCONTINUED | OUTPATIENT
Start: 2023-07-10 | End: 2023-07-10

## 2023-07-10 RX ORDER — HYDROMORPHONE HYDROCHLORIDE 2 MG/ML
2 INJECTION INTRAMUSCULAR; INTRAVENOUS; SUBCUTANEOUS EVERY 6 HOURS
Refills: 0 | Status: DISCONTINUED | OUTPATIENT
Start: 2023-07-10 | End: 2023-07-10

## 2023-07-10 RX ADMIN — GABAPENTIN 300 MILLIGRAM(S): 400 CAPSULE ORAL at 23:43

## 2023-07-10 RX ADMIN — Medication 100 MILLIGRAM(S): at 13:28

## 2023-07-10 RX ADMIN — GABAPENTIN 300 MILLIGRAM(S): 400 CAPSULE ORAL at 05:32

## 2023-07-10 RX ADMIN — Medication 81 MILLIGRAM(S): at 13:28

## 2023-07-10 RX ADMIN — LOSARTAN POTASSIUM 25 MILLIGRAM(S): 100 TABLET, FILM COATED ORAL at 08:15

## 2023-07-10 RX ADMIN — SPIRONOLACTONE 25 MILLIGRAM(S): 25 TABLET, FILM COATED ORAL at 05:32

## 2023-07-10 RX ADMIN — TICAGRELOR 60 MILLIGRAM(S): 90 TABLET ORAL at 05:32

## 2023-07-10 RX ADMIN — TICAGRELOR 60 MILLIGRAM(S): 90 TABLET ORAL at 16:57

## 2023-07-10 RX ADMIN — TAMSULOSIN HYDROCHLORIDE 0.4 MILLIGRAM(S): 0.4 CAPSULE ORAL at 23:43

## 2023-07-10 RX ADMIN — GABAPENTIN 300 MILLIGRAM(S): 400 CAPSULE ORAL at 13:34

## 2023-07-10 NOTE — PROGRESS NOTE ADULT - SUBJECTIVE AND OBJECTIVE BOX
Patient is a 68y old  Male who presents with a chief complaint of Hematuria (10 Jul 2023 10:19)      Subjective and overnight events:  Patient seen and examined at bedside. hematuria improved. no fever, chills, sob, cp, abd pain    ALLERGIES:  No Known Allergies    MEDICATIONS  (STANDING):  aspirin enteric coated 81 milliGRAM(s) Oral daily  gabapentin 300 milliGRAM(s) Oral three times a day  losartan 25 milliGRAM(s) Oral daily  metoprolol succinate  milliGRAM(s) Oral daily  RELUGOLIX (ORGOVYX) 120 milliGRAM(s) 1 Tablet(s) Oral daily  spironolactone 25 milliGRAM(s) Oral daily  tamsulosin 0.4 milliGRAM(s) Oral at bedtime  ticagrelor 60 milliGRAM(s) Oral every 12 hours  Trospium 20 MICROGram(s) 1 Tablet(s) Oral two times a day    MEDICATIONS  (PRN):  HYDROmorphone   Tablet 2 milliGRAM(s) Oral every 6 hours PRN Severe Pain (7 - 10)    Vital Signs Last 24 Hrs  T(F): 98.2 (10 Jul 2023 16:27), Max: 98.4 (09 Jul 2023 20:19)  HR: 74 (10 Jul 2023 16:27) (64 - 86)  BP: 105/61 (10 Jul 2023 16:27) (104/65 - 128/77)  RR: 18 (10 Jul 2023 16:27) (16 - 18)  SpO2: 99% (10 Jul 2023 16:27) (98% - 100%)  I&O's Summary    09 Jul 2023 07:01  -  10 Jul 2023 07:00  --------------------------------------------------------  IN: 1500 mL / OUT: 0 mL / NET: 1500 mL      PHYSICAL EXAM:  General: NAD, A/O x 3  ENT: MMM  Neck: Supple, No JVD  Lungs: Clear to auscultation bilaterally  Cardio: RRR, S1/S2, No murmurs  Abdomen: Soft, Nontender, Nondistended; Bowel sounds present  Extremities: No calf tenderness, No pitting edema    LABS:                        11.8   5.30  )-----------( 249      ( 10 Jul 2023 05:48 )             35.5     07-10    138  |  105  |  20.5  ----------------------------<  104  4.1   |  21.0  |  0.96    Ca    8.9      10 Jul 2023 05:48    TPro  6.6  /  Alb  4.0  /  TBili  0.5  /  DBili  x   /  AST  23  /  ALT  20  /  AlkPhos  124  07-08      PT/INR - ( 08 Jul 2023 16:44 )   PT: 13.1 sec;   INR: 1.13 ratio             Urinalysis Basic - ( 10 Jul 2023 05:48 )    Color: x / Appearance: x / SG: x / pH: x  Gluc: 104 mg/dL / Ketone: x  / Bili: x / Urobili: x   Blood: x / Protein: x / Nitrite: x   Leuk Esterase: x / RBC: x / WBC x   Sq Epi: x / Non Sq Epi: x / Bacteria: x            RADIOLOGY & ADDITIONAL TESTS:    Care Discussed with Consultants/Other Providers:

## 2023-07-10 NOTE — PROGRESS NOTE ADULT - ASSESSMENT
68 year old male with PMH HTN, CAD s/p PCI and prostate cancer s/p resection and XRT was transferred from [facility] for further care. As per patient he has been having hematuria since end April-beginning May when he presented to [facility] and then transferred to [facility] for hyperbaric Oxygen treatment. Intermittent bleeding and hospitalizations for hematuria and urinary retention.     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 No anticoagulation due to hematuria  Mobilize OOBTC

## 2023-07-10 NOTE — PROGRESS NOTE ADULT - SUBJECTIVE AND OBJECTIVE BOX
Urology f/u:    Patient a transfer from [facility] : with recent cardiac procedures and history of prostate cancer with prostatectomy and radiation treatment in [place]. we are following for hematuria. Patient states frequent issues with hematuria on and off ( told due to cardiac medications )   Patient seen and examined at bedside, awake, alert, responsive resting comfortable with persistent suprapubic discomfort however better then on admission.     Vital Signs Last 24 Hrs  T(C): 36.4 (10 Jul 2023 07:52), Max: 36.9 (09 Jul 2023 20:19)  T(F): 97.6 (10 Jul 2023 07:52), Max: 98.4 (09 Jul 2023 20:19)  HR: 64 (10 Jul 2023 07:52) (62 - 72)  BP: 112/73 (10 Jul 2023 07:52) (99/62 - 119/74)  RR: 18 (10 Jul 2023 07:52) (16 - 18)  SpO2: 100% (10 Jul 2023 07:52) (97% - 100%)  Parameters below as of 10 Jul 2023 07:52  Patient On (Oxygen Delivery Method): room air    denies fever, chills, nausea or vomiting  Back/Flanks no pain or tenderness at present  abdomen: soft, mild suprapubic discomfort, non-tender at present  : doyle in place , CBI clamped late last evening, odyle draining well noted yellow/clear urine .  ( to continue off CBI )     Labs:                        11.8   5.30  )-----------( 249      ( 10 Jul 2023 05:48 )             35.5     07-10    138  |  105  | BUN: 20.5<H>  ----------------------------<  104<H>  4.1   |  21.0<L>  | CREAT.: 0.96      Impression:  recurrent hematuria, ( most likely secondary cardiac medications- which can not be stopped ) hematuria at present has resolved   Discussed with Surgeon present situation and continued care and management.   Plan: continue present care and management follow CT scan results today: - OFF CBI and follow. Pending CT review; and will discuss with patient may need cystoscopy in the future.

## 2023-07-11 ENCOUNTER — TRANSCRIPTION ENCOUNTER (OUTPATIENT)
Age: 68
End: 2023-07-11

## 2023-07-11 LAB
ANION GAP SERPL CALC-SCNC: 13 MMOL/L — SIGNIFICANT CHANGE UP (ref 5–17)
BASOPHILS # BLD AUTO: 0.07 K/UL — SIGNIFICANT CHANGE UP (ref 0–0.2)
BASOPHILS NFR BLD AUTO: 1.2 % — SIGNIFICANT CHANGE UP (ref 0–2)
BUN SERPL-MCNC: 19.1 MG/DL — SIGNIFICANT CHANGE UP (ref 8–20)
CALCIUM SERPL-MCNC: 8.9 MG/DL — SIGNIFICANT CHANGE UP (ref 8.4–10.5)
CHLORIDE SERPL-SCNC: 106 MMOL/L — SIGNIFICANT CHANGE UP (ref 96–108)
CO2 SERPL-SCNC: 21 MMOL/L — LOW (ref 22–29)
CREAT SERPL-MCNC: 0.91 MG/DL — SIGNIFICANT CHANGE UP (ref 0.5–1.3)
EGFR: 92 ML/MIN/1.73M2 — SIGNIFICANT CHANGE UP
EOSINOPHIL # BLD AUTO: 0.36 K/UL — SIGNIFICANT CHANGE UP (ref 0–0.5)
EOSINOPHIL NFR BLD AUTO: 6 % — SIGNIFICANT CHANGE UP (ref 0–6)
GLUCOSE SERPL-MCNC: 98 MG/DL — SIGNIFICANT CHANGE UP (ref 70–99)
HCT VFR BLD CALC: 34.4 % — LOW (ref 39–50)
HGB BLD-MCNC: 11.6 G/DL — LOW (ref 13–17)
IMM GRANULOCYTES NFR BLD AUTO: 0.3 % — SIGNIFICANT CHANGE UP (ref 0–0.9)
LYMPHOCYTES # BLD AUTO: 1.27 K/UL — SIGNIFICANT CHANGE UP (ref 1–3.3)
LYMPHOCYTES # BLD AUTO: 21.1 % — SIGNIFICANT CHANGE UP (ref 13–44)
MCHC RBC-ENTMCNC: 31.7 PG — SIGNIFICANT CHANGE UP (ref 27–34)
MCHC RBC-ENTMCNC: 33.7 GM/DL — SIGNIFICANT CHANGE UP (ref 32–36)
MCV RBC AUTO: 94 FL — SIGNIFICANT CHANGE UP (ref 80–100)
MONOCYTES # BLD AUTO: 0.54 K/UL — SIGNIFICANT CHANGE UP (ref 0–0.9)
MONOCYTES NFR BLD AUTO: 9 % — SIGNIFICANT CHANGE UP (ref 2–14)
NEUTROPHILS # BLD AUTO: 3.75 K/UL — SIGNIFICANT CHANGE UP (ref 1.8–7.4)
NEUTROPHILS NFR BLD AUTO: 62.4 % — SIGNIFICANT CHANGE UP (ref 43–77)
PLATELET # BLD AUTO: 253 K/UL — SIGNIFICANT CHANGE UP (ref 150–400)
POTASSIUM SERPL-MCNC: 4.1 MMOL/L — SIGNIFICANT CHANGE UP (ref 3.5–5.3)
POTASSIUM SERPL-SCNC: 4.1 MMOL/L — SIGNIFICANT CHANGE UP (ref 3.5–5.3)
RBC # BLD: 3.66 M/UL — LOW (ref 4.2–5.8)
RBC # FLD: 13.1 % — SIGNIFICANT CHANGE UP (ref 10.3–14.5)
SODIUM SERPL-SCNC: 140 MMOL/L — SIGNIFICANT CHANGE UP (ref 135–145)
WBC # BLD: 6.01 K/UL — SIGNIFICANT CHANGE UP (ref 3.8–10.5)
WBC # FLD AUTO: 6.01 K/UL — SIGNIFICANT CHANGE UP (ref 3.8–10.5)

## 2023-07-11 PROCEDURE — 99232 SBSQ HOSP IP/OBS MODERATE 35: CPT

## 2023-07-11 RX ADMIN — Medication 81 MILLIGRAM(S): at 13:58

## 2023-07-11 RX ADMIN — TICAGRELOR 60 MILLIGRAM(S): 90 TABLET ORAL at 06:24

## 2023-07-11 RX ADMIN — LOSARTAN POTASSIUM 25 MILLIGRAM(S): 100 TABLET, FILM COATED ORAL at 06:22

## 2023-07-11 RX ADMIN — Medication 100 MILLIGRAM(S): at 14:00

## 2023-07-11 RX ADMIN — GABAPENTIN 300 MILLIGRAM(S): 400 CAPSULE ORAL at 21:41

## 2023-07-11 RX ADMIN — SPIRONOLACTONE 25 MILLIGRAM(S): 25 TABLET, FILM COATED ORAL at 06:22

## 2023-07-11 RX ADMIN — TAMSULOSIN HYDROCHLORIDE 0.4 MILLIGRAM(S): 0.4 CAPSULE ORAL at 21:41

## 2023-07-11 RX ADMIN — GABAPENTIN 300 MILLIGRAM(S): 400 CAPSULE ORAL at 13:58

## 2023-07-11 RX ADMIN — GABAPENTIN 300 MILLIGRAM(S): 400 CAPSULE ORAL at 06:22

## 2023-07-11 RX ADMIN — TICAGRELOR 60 MILLIGRAM(S): 90 TABLET ORAL at 17:54

## 2023-07-11 NOTE — PROGRESS NOTE ADULT - ASSESSMENT
68 year old male with PMH HTN, CAD s/p PCI and prostate cancer s/p resection and XRT was transferred from [facility] for further care. As per patient he has been having hematuria since end April-beginning May when [place] [facility] [place] to [facility] for hyperbaric Oxygen treatment. Intermittent bleeding and hospitalizations for hematuria and urinary retention.     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 No anticoagulation due to hematuria  Mobilize OOBTC

## 2023-07-11 NOTE — PROGRESS NOTE ADULT - SUBJECTIVE AND OBJECTIVE BOX
Mallika Hinkle M.D.    Patient is a 68y old  Male who presents with a chief complaint of Hematuria (10 Jul 2023 17:55)      SUBJECTIVE / OVERNIGHT EVENTS: no event overnight, on CBI, bleeding better. Frustrated about continued intermittent hematuria.     Patient denies chest pain, SOB, abd pain, N/V, fever, chills, dysuria or any other complaints. All remainder ROS negative.     MEDICATIONS  (STANDING):  aspirin enteric coated 81 milliGRAM(s) Oral daily  gabapentin 300 milliGRAM(s) Oral three times a day  losartan 25 milliGRAM(s) Oral daily  metoprolol succinate  milliGRAM(s) Oral daily  RELUGOLIX (ORGOVYX) 120 milliGRAM(s) 1 Tablet(s) Oral daily  spironolactone 25 milliGRAM(s) Oral daily  tamsulosin 0.4 milliGRAM(s) Oral at bedtime  ticagrelor 60 milliGRAM(s) Oral every 12 hours  Trospium 20 MICROGram(s) 1 Tablet(s) Oral two times a day    MEDICATIONS  (PRN):  HYDROmorphone   Tablet 2 milliGRAM(s) Oral every 6 hours PRN Severe Pain (7 - 10)      I&O's Summary      PHYSICAL EXAM:  Vital Signs Last 24 Hrs  T(C): 36.8 (11 Jul 2023 11:26), Max: 37.1 (10 Jul 2023 18:45)  T(F): 98.2 (11 Jul 2023 11:26), Max: 98.7 (10 Jul 2023 18:45)  HR: 67 (11 Jul 2023 11:26) (61 - 78)  BP: 103/72 (11 Jul 2023 11:26) (103/72 - 119/76)  BP(mean): --  RR: 18 (11 Jul 2023 11:26) (18 - 18)  SpO2: 99% (11 Jul 2023 11:26) (97% - 99%)    Parameters below as of 11 Jul 2023 11:26  Patient On (Oxygen Delivery Method): room air    General: NAD, A/O x 3  ENT: MMM  Neck: Supple, No JVD  Lungs: Clear to auscultation bilaterally  Cardio: RRR, S1/S2, No murmurs  Abdomen: Soft, Nontender, Nondistended; Bowel sounds present. + Tate with yellow urine noted  Extremities: No calf tenderness, No pitting edema    LABS:                        11.6   6.01  )-----------( 253      ( 11 Jul 2023 05:44 )             34.4     07-11    140  |  106  |  19.1  ----------------------------<  98  4.1   |  21.0<L>  |  0.91    Ca    8.9      11 Jul 2023 05:44            Urinalysis Basic - ( 11 Jul 2023 05:44 )    Color: x / Appearance: x / SG: x / pH: x  Gluc: 98 mg/dL / Ketone: x  / Bili: x / Urobili: x   Blood: x / Protein: x / Nitrite: x   Leuk Esterase: x / RBC: x / WBC x   Sq Epi: x / Non Sq Epi: x / Bacteria: x        CAPILLARY BLOOD GLUCOSE          RADIOLOGY & ADDITIONAL TESTS:  Results Reviewed:   Imaging Personally Reviewed:  Electrocardiogram Personally Reviewed:

## 2023-07-12 LAB
ANION GAP SERPL CALC-SCNC: 13 MMOL/L — SIGNIFICANT CHANGE UP (ref 5–17)
APTT BLD: 34.1 SEC — SIGNIFICANT CHANGE UP (ref 27.5–35.5)
BLD GP AB SCN SERPL QL: SIGNIFICANT CHANGE UP
BUN SERPL-MCNC: 16 MG/DL — SIGNIFICANT CHANGE UP (ref 8–20)
CALCIUM SERPL-MCNC: 9 MG/DL — SIGNIFICANT CHANGE UP (ref 8.4–10.5)
CHLORIDE SERPL-SCNC: 104 MMOL/L — SIGNIFICANT CHANGE UP (ref 96–108)
CO2 SERPL-SCNC: 22 MMOL/L — SIGNIFICANT CHANGE UP (ref 22–29)
CREAT SERPL-MCNC: 0.89 MG/DL — SIGNIFICANT CHANGE UP (ref 0.5–1.3)
EGFR: 94 ML/MIN/1.73M2 — SIGNIFICANT CHANGE UP
GLUCOSE SERPL-MCNC: 108 MG/DL — HIGH (ref 70–99)
HCT VFR BLD CALC: 35.7 % — LOW (ref 39–50)
HGB BLD-MCNC: 12.1 G/DL — LOW (ref 13–17)
INR BLD: 1.06 RATIO — SIGNIFICANT CHANGE UP (ref 0.88–1.16)
MCHC RBC-ENTMCNC: 31.9 PG — SIGNIFICANT CHANGE UP (ref 27–34)
MCHC RBC-ENTMCNC: 33.9 GM/DL — SIGNIFICANT CHANGE UP (ref 32–36)
MCV RBC AUTO: 94.2 FL — SIGNIFICANT CHANGE UP (ref 80–100)
PLATELET # BLD AUTO: 259 K/UL — SIGNIFICANT CHANGE UP (ref 150–400)
POTASSIUM SERPL-MCNC: 4.3 MMOL/L — SIGNIFICANT CHANGE UP (ref 3.5–5.3)
POTASSIUM SERPL-SCNC: 4.3 MMOL/L — SIGNIFICANT CHANGE UP (ref 3.5–5.3)
PROTHROM AB SERPL-ACNC: 12.3 SEC — SIGNIFICANT CHANGE UP (ref 10.5–13.4)
RBC # BLD: 3.79 M/UL — LOW (ref 4.2–5.8)
RBC # FLD: 13.1 % — SIGNIFICANT CHANGE UP (ref 10.3–14.5)
SODIUM SERPL-SCNC: 139 MMOL/L — SIGNIFICANT CHANGE UP (ref 135–145)
WBC # BLD: 5.73 K/UL — SIGNIFICANT CHANGE UP (ref 3.8–10.5)
WBC # FLD AUTO: 5.73 K/UL — SIGNIFICANT CHANGE UP (ref 3.8–10.5)

## 2023-07-12 PROCEDURE — 99233 SBSQ HOSP IP/OBS HIGH 50: CPT

## 2023-07-12 PROCEDURE — 52214 CYSTOSCOPY AND TREATMENT: CPT

## 2023-07-12 RX ORDER — LANOLIN ALCOHOL/MO/W.PET/CERES
3 CREAM (GRAM) TOPICAL AT BEDTIME
Refills: 0 | Status: DISCONTINUED | OUTPATIENT
Start: 2023-07-12 | End: 2023-07-14

## 2023-07-12 RX ORDER — SODIUM CHLORIDE 9 MG/ML
1000 INJECTION, SOLUTION INTRAVENOUS
Refills: 0 | Status: DISCONTINUED | OUTPATIENT
Start: 2023-07-12 | End: 2023-07-12

## 2023-07-12 RX ORDER — FENTANYL CITRATE 50 UG/ML
50 INJECTION INTRAVENOUS
Refills: 0 | Status: DISCONTINUED | OUTPATIENT
Start: 2023-07-12 | End: 2023-07-12

## 2023-07-12 RX ORDER — ONDANSETRON 8 MG/1
4 TABLET, FILM COATED ORAL ONCE
Refills: 0 | Status: DISCONTINUED | OUTPATIENT
Start: 2023-07-12 | End: 2023-07-12

## 2023-07-12 RX ADMIN — Medication 100 MILLIGRAM(S): at 12:32

## 2023-07-12 RX ADMIN — GABAPENTIN 300 MILLIGRAM(S): 400 CAPSULE ORAL at 12:32

## 2023-07-12 RX ADMIN — SPIRONOLACTONE 25 MILLIGRAM(S): 25 TABLET, FILM COATED ORAL at 06:57

## 2023-07-12 RX ADMIN — GABAPENTIN 300 MILLIGRAM(S): 400 CAPSULE ORAL at 06:57

## 2023-07-12 RX ADMIN — LOSARTAN POTASSIUM 25 MILLIGRAM(S): 100 TABLET, FILM COATED ORAL at 06:57

## 2023-07-12 RX ADMIN — TAMSULOSIN HYDROCHLORIDE 0.4 MILLIGRAM(S): 0.4 CAPSULE ORAL at 23:04

## 2023-07-12 RX ADMIN — HYDROMORPHONE HYDROCHLORIDE 2 MILLIGRAM(S): 2 INJECTION INTRAMUSCULAR; INTRAVENOUS; SUBCUTANEOUS at 23:04

## 2023-07-12 RX ADMIN — TICAGRELOR 60 MILLIGRAM(S): 90 TABLET ORAL at 17:36

## 2023-07-12 RX ADMIN — GABAPENTIN 300 MILLIGRAM(S): 400 CAPSULE ORAL at 23:04

## 2023-07-12 NOTE — BRIEF OPERATIVE NOTE - NSICDXBRIEFPREOP_GEN_ALL_CORE_FT
PRE-OP DIAGNOSIS:  Gross hematuria 12-Jul-2023 20:39:15  Yared Monae  Radiation cystitis 12-Jul-2023 20:39:39  Yared Monae

## 2023-07-12 NOTE — BRIEF OPERATIVE NOTE - NSICDXBRIEFPOSTOP_GEN_ALL_CORE_FT
POST-OP DIAGNOSIS:  Gross hematuria 12-Jul-2023 20:39:51  Yared Monae  Radiation cystitis 12-Jul-2023 20:40:01  Yared Monae

## 2023-07-12 NOTE — CONSULT NOTE ADULT - SUBJECTIVE AND OBJECTIVE BOX
UROLOGY CONSULT: Patient was an accepted transfer from [place], he is a 69 yo M with recent cardiac stents, and an extensive hx of prostate ca. He has underwent radiation at [facility], and prostatectomy at [facility]. He has been being followed by [facility], does not have a primary urologist. He states he started urinating blood a few days.   Denies fever, chills, abd pain, flank pain, n/v/d.     Review of Systems: All negative except where noted in HPI    Vital Signs Last 24 Hrs  T(C): 36.7 (08 Jul 2023 11:36), Max: 36.7 (08 Jul 2023 11:36)  T(F): 98.1 (08 Jul 2023 11:36), Max: 98.1 (08 Jul 2023 11:36)  HR: 65 (08 Jul 2023 11:36) (65 - 65)  BP: 121/79 (08 Jul 2023 11:36) (121/79 - 121/79)  BP(mean): --  RR: 19 (08 Jul 2023 11:36) (19 - 19)  SpO2: 100% (08 Jul 2023 11:36) (100% - 100%)    Physical Exam:  General: NAD  HEENT: PERRL, EOMI  Neck: No JVD, FROM without pain  Pulm: Respirations non labored, no accessory muscle use  Abdomen: Soft, NT/ND, without rebound or guarding  Neuro: Alert and oriented x3, no focal deficits  : 3 way doyle in place with CBI running, doyle with light pink urine, old clots seen in bag     A: Patient is a 69 yo M with extensive hx of prostate ca, transfer from [place] with hematuria, likely post radiation cystitis, CBI running.     Plan:   Continue CBI   Trend h/h   Possible cysto in future       
68-year-old  gentleman with past medical history of prostate cancer, MI status post stent placement (on Brilinta), hematuria (underwent cystoscopy at [facility] and noted to have radiation cystitis), anemia, presenting with hematuria.  Patient follows up with Dr. Raza at [facility] for prostate cancer.  On Orgovyx.  PSMA PET scan on 3/16/23 was negative for any recurrence.  Patient received Injectafer 750 mg IV for iron deficiency on 6/8/23 and 6/15/23.    MEDICATIONS  (STANDING):  aspirin enteric coated 81 milliGRAM(s) Oral daily  gabapentin 300 milliGRAM(s) Oral three times a day  losartan 25 milliGRAM(s) Oral daily  metoprolol succinate  milliGRAM(s) Oral daily  RELUGOLIX (ORGOVYX) 120 milliGRAM(s) 1 Tablet(s) Oral daily  spironolactone 25 milliGRAM(s) Oral daily  tamsulosin 0.4 milliGRAM(s) Oral at bedtime  ticagrelor 60 milliGRAM(s) Oral every 12 hours  Trospium 20 MICROGram(s) 1 Tablet(s) Oral two times a day    CONSTITUTIONAL: NAD, well-groomed  ENMT: Moist oral mucosa, no pharyngeal injection or exudates; normal dentition  RESPIRATORY: Normal respiratory effort; lungs are clear to auscultation bilaterally  CARDIOVASCULAR: Regular rate and rhythm; No lower extremity edema  ABDOMEN: Nontender to palpation, + Tate with pink urine  PSYCH: A+O x3; affect appropriate    PHYSICAL EXAM:  Vital Signs Last 24 Hrs  T(C): 36.5 (12 Jul 2023 11:14), Max: 37 (11 Jul 2023 14:00)  T(F): 97.7 (12 Jul 2023 11:14), Max: 98.6 (11 Jul 2023 14:00)  HR: 71 (12 Jul 2023 11:14) (61 - 82)  BP: 127/84 (12 Jul 2023 11:14) (109/67 - 131/66)  BP(mean): --  RR: 18 (12 Jul 2023 11:14) (18 - 18)  SpO2: 98% (12 Jul 2023 11:14) (97% - 98%)    Parameters below as of 12 Jul 2023 11:14  Patient On (Oxygen Delivery Method): room air    CONSTITUTIONAL: NAD, well-groomed  ENMT: Moist oral mucosa, no pharyngeal injection or exudates; normal dentition  RESPIRATORY: Normal respiratory effort; lungs are clear to auscultation bilaterally  CARDIOVASCULAR: Regular rate and rhythm; No lower extremity edema  ABDOMEN: Nontender to palpation, + Tate with pink urine  PSYCH: A+O x3; affect appropriate    LABS:                        12.1   5.73  )-----------( 259      ( 12 Jul 2023 06:51 )             35.7     07-12    139  |  104  |  16.0  ----------------------------<  108<H>  4.3   |  22.0  |  0.89    Ca    9.0      12 Jul 2023 06:51      PT/INR - ( 12 Jul 2023 06:51 )   PT: 12.3 sec;   INR: 1.06 ratio         PTT - ( 12 Jul 2023 06:51 )  PTT:34.1 sec

## 2023-07-12 NOTE — PROGRESS NOTE ADULT - ASSESSMENT
68 year old male with PMH HTN, CAD s/p PCI and prostate cancer s/p resection and XRT was transferred from [facility] for further care. As per patient he has been having hematuria since end April-beginning May when he presented to [facility] and then transferred to [facility] for hyperbaric Oxygen treatment. Intermittent bleeding and hospitalizations for hematuria and urinary retention.     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 No medical contraindication to proceed with the planned procedure    CAD with prior PCI (April 2023)  HTN  -  DAPT; patient had been advised to continue ASA and Brilinta at lower dose    - Toprol   - Losartan 25 QD  - Aldactone 25 QD     VTE Prophylaxis  - VCD. No anticoagulation due to hematuria  Mobilize OOBTC           68 year old male with PMH HTN, CAD s/p PCI and prostate cancer s/p resection and XRT was transferred from [facility] for further care. As per patient he has been having hematuria since end April-beginning May when he presented to [place] and then transferred to [facility] for hyperbaric Oxygen treatment. Intermittent bleeding and hospitalizations for hematuria and urinary retention.     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 No medical contraindication to proceed with the planned procedure    CAD with prior PCI (April 2023)  HTN  -  DAPT; patient had been advised to continue ASA and Brilinta at lower dose    - Toprol   - Losartan 25 QD  - Aldactone 25 QD     VTE Prophylaxis  - VCD. No anticoagulation due to hematuria  Mobilize OOBTC

## 2023-07-12 NOTE — PROGRESS NOTE ADULT - SUBJECTIVE AND OBJECTIVE BOX
Mallika Hinkle M.D.    Patient is a 68y old  Male who presents with a chief complaint of Hematuria (11 Jul 2023 13:23)      SUBJECTIVE / OVERNIGHT EVENTS: Hematuria restarted. Reports able to go flights and stairs and ambulate multiple blocks without issues.     Patient denies chest pain, SOB, abd pain, N/V, fever, chills, dysuria or any other complaints. All remainder ROS negative.     MEDICATIONS  (STANDING):  aspirin enteric coated 81 milliGRAM(s) Oral daily  gabapentin 300 milliGRAM(s) Oral three times a day  losartan 25 milliGRAM(s) Oral daily  metoprolol succinate  milliGRAM(s) Oral daily  RELUGOLIX (ORGOVYX) 120 milliGRAM(s) 1 Tablet(s) Oral daily  spironolactone 25 milliGRAM(s) Oral daily  tamsulosin 0.4 milliGRAM(s) Oral at bedtime  ticagrelor 60 milliGRAM(s) Oral every 12 hours  Trospium 20 MICROGram(s) 1 Tablet(s) Oral two times a day    MEDICATIONS  (PRN):  HYDROmorphone   Tablet 2 milliGRAM(s) Oral every 6 hours PRN Severe Pain (7 - 10)      I&O's Summary      PHYSICAL EXAM:  Vital Signs Last 24 Hrs  T(C): 36.5 (12 Jul 2023 11:14), Max: 37 (11 Jul 2023 14:00)  T(F): 97.7 (12 Jul 2023 11:14), Max: 98.6 (11 Jul 2023 14:00)  HR: 71 (12 Jul 2023 11:14) (61 - 82)  BP: 127/84 (12 Jul 2023 11:14) (109/67 - 131/66)  BP(mean): --  RR: 18 (12 Jul 2023 11:14) (18 - 18)  SpO2: 98% (12 Jul 2023 11:14) (97% - 98%)    Parameters below as of 12 Jul 2023 11:14  Patient On (Oxygen Delivery Method): room air      CONSTITUTIONAL: NAD, well-groomed  ENMT: Moist oral mucosa, no pharyngeal injection or exudates; normal dentition  RESPIRATORY: Normal respiratory effort; lungs are clear to auscultation bilaterally  CARDIOVASCULAR: Regular rate and rhythm; No lower extremity edema  ABDOMEN: Nontender to palpation, + Tate with pink urine  PSYCH: A+O x3; affect appropriate    LABS:                        12.1   5.73  )-----------( 259      ( 12 Jul 2023 06:51 )             35.7     07-12    139  |  104  |  16.0  ----------------------------<  108<H>  4.3   |  22.0  |  0.89    Ca    9.0      12 Jul 2023 06:51      PT/INR - ( 12 Jul 2023 06:51 )   PT: 12.3 sec;   INR: 1.06 ratio         PTT - ( 12 Jul 2023 06:51 )  PTT:34.1 sec      Urinalysis Basic - ( 12 Jul 2023 06:51 )    Color: x / Appearance: x / SG: x / pH: x  Gluc: 108 mg/dL / Ketone: x  / Bili: x / Urobili: x   Blood: x / Protein: x / Nitrite: x   Leuk Esterase: x / RBC: x / WBC x   Sq Epi: x / Non Sq Epi: x / Bacteria: x        CAPILLARY BLOOD GLUCOSE          RADIOLOGY & ADDITIONAL TESTS:  Results Reviewed:   Imaging Personally Reviewed:  Electrocardiogram Personally Reviewed:

## 2023-07-12 NOTE — ASU PREOP CHECKLIST - NSSDAENDDT_GEN_ALL_CORE
Data:Marco Antonio attended the men's group  He shared he is driving more and getting out more  He has been in contact with several women via dating sites but stated the group has helped him in that he now has no issue drawing boundaries if he feels someone is playing games  He is staying away from women who simply want to party and he shared he met many like that  He is attending his clubhouse for support for longer periods and as stated he feels the group has helped him to feel better about himself, to take more appropriate risks and to help him from over focusing on health issues  Assessment: Janusz Mills shared he feels the group has helped him in so many ways  He enjoys the interactions with the other clients  Plan: Continue to encourage him to remain more socially interactive  12-Jul-2023 19:22

## 2023-07-12 NOTE — CONSULT NOTE ADULT - ASSESSMENT
68-year-old  gentleman with past medical history of prostate cancer, MI status post stent placement (on Brilinta), hematuria (underwent cystoscopy at [facility] and noted to have radiation cystitis), anemia, presenting with hematuria.  Patient follows up with Dr. Raza at [facility] for prostate cancer.  On Orgovyx.  PSMA PET scan on 3/16/23 was negative for any recurrence.  Patient received Injectafer 750 mg IV for iron deficiency on 6/8/23 and 6/15/23.    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 PRBC transfusion to keep hemoglobin more than 8.5 given the  coronary artery disease   - can send B12, folate, retic count, LDH common profile, ferritin    CAD with prior PCI (April 2023)  HTN  -  DAPT; patient had been advised to continue ASA and Brilinta at lower dose    - Toprol   - Losartan 25 QD  - Aldactone 25 QD     VTE Prophylaxis  - VCD. No anticoagulation due to hematuria

## 2023-07-13 ENCOUNTER — TRANSCRIPTION ENCOUNTER (OUTPATIENT)
Age: 68
End: 2023-07-13

## 2023-07-13 LAB
ANION GAP SERPL CALC-SCNC: 15 MMOL/L — SIGNIFICANT CHANGE UP (ref 5–17)
BUN SERPL-MCNC: 13.3 MG/DL — SIGNIFICANT CHANGE UP (ref 8–20)
CALCIUM SERPL-MCNC: 9.3 MG/DL — SIGNIFICANT CHANGE UP (ref 8.4–10.5)
CHLORIDE SERPL-SCNC: 101 MMOL/L — SIGNIFICANT CHANGE UP (ref 96–108)
CO2 SERPL-SCNC: 22 MMOL/L — SIGNIFICANT CHANGE UP (ref 22–29)
CREAT SERPL-MCNC: 0.85 MG/DL — SIGNIFICANT CHANGE UP (ref 0.5–1.3)
EGFR: 95 ML/MIN/1.73M2 — SIGNIFICANT CHANGE UP
GLUCOSE SERPL-MCNC: 148 MG/DL — HIGH (ref 70–99)
HCT VFR BLD CALC: 36 % — LOW (ref 39–50)
HGB BLD-MCNC: 12.2 G/DL — LOW (ref 13–17)
MCHC RBC-ENTMCNC: 31.9 PG — SIGNIFICANT CHANGE UP (ref 27–34)
MCHC RBC-ENTMCNC: 33.9 GM/DL — SIGNIFICANT CHANGE UP (ref 32–36)
MCV RBC AUTO: 94.2 FL — SIGNIFICANT CHANGE UP (ref 80–100)
PLATELET # BLD AUTO: 276 K/UL — SIGNIFICANT CHANGE UP (ref 150–400)
POTASSIUM SERPL-MCNC: 4.4 MMOL/L — SIGNIFICANT CHANGE UP (ref 3.5–5.3)
POTASSIUM SERPL-SCNC: 4.4 MMOL/L — SIGNIFICANT CHANGE UP (ref 3.5–5.3)
RBC # BLD: 3.82 M/UL — LOW (ref 4.2–5.8)
RBC # FLD: 13 % — SIGNIFICANT CHANGE UP (ref 10.3–14.5)
SODIUM SERPL-SCNC: 137 MMOL/L — SIGNIFICANT CHANGE UP (ref 135–145)
WBC # BLD: 6.99 K/UL — SIGNIFICANT CHANGE UP (ref 3.8–10.5)
WBC # FLD AUTO: 6.99 K/UL — SIGNIFICANT CHANGE UP (ref 3.8–10.5)

## 2023-07-13 PROCEDURE — 99232 SBSQ HOSP IP/OBS MODERATE 35: CPT

## 2023-07-13 RX ORDER — HYDROMORPHONE HYDROCHLORIDE 2 MG/ML
0.5 INJECTION INTRAMUSCULAR; INTRAVENOUS; SUBCUTANEOUS ONCE
Refills: 0 | Status: DISCONTINUED | OUTPATIENT
Start: 2023-07-13 | End: 2023-07-13

## 2023-07-13 RX ADMIN — TAMSULOSIN HYDROCHLORIDE 0.4 MILLIGRAM(S): 0.4 CAPSULE ORAL at 21:27

## 2023-07-13 RX ADMIN — GABAPENTIN 300 MILLIGRAM(S): 400 CAPSULE ORAL at 13:13

## 2023-07-13 RX ADMIN — GABAPENTIN 300 MILLIGRAM(S): 400 CAPSULE ORAL at 21:27

## 2023-07-13 RX ADMIN — GABAPENTIN 300 MILLIGRAM(S): 400 CAPSULE ORAL at 05:54

## 2023-07-13 RX ADMIN — LOSARTAN POTASSIUM 25 MILLIGRAM(S): 100 TABLET, FILM COATED ORAL at 05:54

## 2023-07-13 RX ADMIN — Medication 81 MILLIGRAM(S): at 11:09

## 2023-07-13 RX ADMIN — Medication 3 MILLIGRAM(S): at 21:27

## 2023-07-13 RX ADMIN — TICAGRELOR 60 MILLIGRAM(S): 90 TABLET ORAL at 05:53

## 2023-07-13 RX ADMIN — TICAGRELOR 60 MILLIGRAM(S): 90 TABLET ORAL at 17:03

## 2023-07-13 RX ADMIN — HYDROMORPHONE HYDROCHLORIDE 2 MILLIGRAM(S): 2 INJECTION INTRAMUSCULAR; INTRAVENOUS; SUBCUTANEOUS at 05:53

## 2023-07-13 RX ADMIN — HYDROMORPHONE HYDROCHLORIDE 2 MILLIGRAM(S): 2 INJECTION INTRAMUSCULAR; INTRAVENOUS; SUBCUTANEOUS at 16:33

## 2023-07-13 RX ADMIN — HYDROMORPHONE HYDROCHLORIDE 2 MILLIGRAM(S): 2 INJECTION INTRAMUSCULAR; INTRAVENOUS; SUBCUTANEOUS at 17:31

## 2023-07-13 RX ADMIN — HYDROMORPHONE HYDROCHLORIDE 0.5 MILLIGRAM(S): 2 INJECTION INTRAMUSCULAR; INTRAVENOUS; SUBCUTANEOUS at 00:35

## 2023-07-13 RX ADMIN — SPIRONOLACTONE 25 MILLIGRAM(S): 25 TABLET, FILM COATED ORAL at 05:54

## 2023-07-13 RX ADMIN — HYDROMORPHONE HYDROCHLORIDE 2 MILLIGRAM(S): 2 INJECTION INTRAMUSCULAR; INTRAVENOUS; SUBCUTANEOUS at 06:53

## 2023-07-13 RX ADMIN — HYDROMORPHONE HYDROCHLORIDE 2 MILLIGRAM(S): 2 INJECTION INTRAMUSCULAR; INTRAVENOUS; SUBCUTANEOUS at 23:59

## 2023-07-13 RX ADMIN — HYDROMORPHONE HYDROCHLORIDE 2 MILLIGRAM(S): 2 INJECTION INTRAMUSCULAR; INTRAVENOUS; SUBCUTANEOUS at 00:47

## 2023-07-13 RX ADMIN — HYDROMORPHONE HYDROCHLORIDE 0.5 MILLIGRAM(S): 2 INJECTION INTRAMUSCULAR; INTRAVENOUS; SUBCUTANEOUS at 01:35

## 2023-07-13 NOTE — DISCHARGE NOTE PROVIDER - CARE PROVIDER_API CALL
Yared Monae  Urology  [address] [place] [address]  Phone: [phone]  Fax: [phone]  Follow Up Time: 1 week

## 2023-07-13 NOTE — PROGRESS NOTE ADULT - SUBJECTIVE AND OBJECTIVE BOX
Subjective: patient seen and examined at bedside this morning. Hx of hematuria, suprapuic discomfort on admission, getting better. Now POD 1 s/p cystoscopy with bladder fulguration for bleeding. Patient doing well post op. Not complaining of pain, CBI is running and urine is clear. Patient denies fever, chills, CP, SOB.       MEDICATIONS  (STANDING):  aspirin enteric coated 81 milliGRAM(s) Oral daily  gabapentin 300 milliGRAM(s) Oral three times a day  losartan 25 milliGRAM(s) Oral daily  metoprolol succinate  milliGRAM(s) Oral daily  RELUGOLIX (ORGOVYX) 120 milliGRAM(s) 1 Tablet(s) Oral daily  spironolactone 25 milliGRAM(s) Oral daily  tamsulosin 0.4 milliGRAM(s) Oral at bedtime  ticagrelor 60 milliGRAM(s) Oral every 12 hours  Trospium 20 MICROGram(s) 1 Tablet(s) Oral two times a day    MEDICATIONS  (PRN):  HYDROmorphone   Tablet 2 milliGRAM(s) Oral every 6 hours PRN Severe Pain (7 - 10)  melatonin 3 milliGRAM(s) Oral at bedtime PRN Sleep      Vital Signs Last 24 Hrs  T(C): 36.3 (13 Jul 2023 04:38), Max: 36.6 (12 Jul 2023 19:04)  T(F): 97.4 (13 Jul 2023 04:38), Max: 97.8 (12 Jul 2023 19:04)  HR: 79 (13 Jul 2023 04:38) (61 - 87)  BP: 116/72 (13 Jul 2023 04:38) (107/88 - 156/98)  BP(mean): 81 (12 Jul 2023 22:05) (81 - 118)  RR: 18 (13 Jul 2023 04:38) (12 - 20)  SpO2: 94% (13 Jul 2023 04:38) (94% - 100%)    Parameters below as of 13 Jul 2023 04:38  Patient On (Oxygen Delivery Method): room air        Physical Exam:    Constitutional: NAD  HEENT: PERRL, EOMI  Respiratory: Respirations non-labored, no accessory muscle use  Gastrointestinal: Soft, non-tender, non-distended  Neurological: A&O x 3  denies fever, chills, nausea or vomiting  abdomen: soft, no suprapubic tenderness.   : doyle in place , Back/Flanks no pain or tenderness at present CBI running clear...      LABS:                        12.2   6.99  )-----------( 276      ( 13 Jul 2023 05:24 )             36.0     07-13    137  |  101  |  13.3  ----------------------------<  148<H>  4.4   |  22.0  |  0.85    Ca    9.3      13 Jul 2023 05:24      PT/INR - ( 12 Jul 2023 06:51 )   PT: 12.3 sec;   INR: 1.06 ratio         PTT - ( 12 Jul 2023 06:51 )  PTT:34.1 sec  Urinalysis Basic - ( 13 Jul 2023 05:24 )    Color: x / Appearance: x / SG: x / pH: x  Gluc: 148 mg/dL / Ketone: x  / Bili: x / Urobili: x   Blood: x / Protein: x / Nitrite: x   Leuk Esterase: x / RBC: x / WBC x   Sq Epi: x / Non Sq Epi: x / Bacteria: x        A: 69 yo male pmh prostate ca with prostatectomy and radiation presenting with hematuria. Now POD 1 S/P cytoscopy for bleeding. CBI running and urine output clear...no new events. patient doing well post op.       Plan:   -Continue CBI, monitor output  -trend h/h  -will continue to follow.      Subjective: patient seen and examined at bedside this morning. Hx of hematuria, suprapuic discomfort on admission, getting better. Now POD 1 s/p cystoscopy with bladder fulguration for bleeding. Patient doing well post op. Not complaining of pain, CBI is running and urine is clear. Patient denies fever, chills, CP, SOB.       MEDICATIONS  (STANDING):  aspirin enteric coated 81 milliGRAM(s) Oral daily  gabapentin 300 milliGRAM(s) Oral three times a day  losartan 25 milliGRAM(s) Oral daily  metoprolol succinate  milliGRAM(s) Oral daily  RELUGOLIX (ORGOVYX) 120 milliGRAM(s) 1 Tablet(s) Oral daily  spironolactone 25 milliGRAM(s) Oral daily  tamsulosin 0.4 milliGRAM(s) Oral at bedtime  ticagrelor 60 milliGRAM(s) Oral every 12 hours  Trospium 20 MICROGram(s) 1 Tablet(s) Oral two times a day    MEDICATIONS  (PRN):  HYDROmorphone   Tablet 2 milliGRAM(s) Oral every 6 hours PRN Severe Pain (7 - 10)  melatonin 3 milliGRAM(s) Oral at bedtime PRN Sleep      Vital Signs Last 24 Hrs  T(C): 36.3 (13 Jul 2023 04:38), Max: 36.6 (12 Jul 2023 19:04)  T(F): 97.4 (13 Jul 2023 04:38), Max: 97.8 (12 Jul 2023 19:04)  HR: 79 (13 Jul 2023 04:38) (61 - 87)  BP: 116/72 (13 Jul 2023 04:38) (107/88 - 156/98)  BP(mean): 81 (12 Jul 2023 22:05) (81 - 118)  RR: 18 (13 Jul 2023 04:38) (12 - 20)  SpO2: 94% (13 Jul 2023 04:38) (94% - 100%)    Parameters below as of 13 Jul 2023 04:38  Patient On (Oxygen Delivery Method): room air        Physical Exam:    Constitutional: NAD  HEENT: PERRL, EOMI  Respiratory: Respirations non-labored, no accessory muscle use  Gastrointestinal: Soft, non-tender, non-distended  Neurological: A&O x 3  denies fever, chills, nausea or vomiting  abdomen: soft, non-tender, no suprapubic tenderness.   : doyle in place , Back/Flanks no pain or tenderness at present CBI running, urine clear, no blood, clots or sediment noted.     LABS:                        12.2   6.99  )-----------( 276      ( 13 Jul 2023 05:24 )             36.0     07-13    137  |  101  |  13.3  ----------------------------<  148<H>  4.4   |  22.0  |  0.85    Ca    9.3      13 Jul 2023 05:24      PT/INR - ( 12 Jul 2023 06:51 )   PT: 12.3 sec;   INR: 1.06 ratio         PTT - ( 12 Jul 2023 06:51 )  PTT:34.1 sec  Urinalysis Basic - ( 13 Jul 2023 05:24 )    Color: x / Appearance: x / SG: x / pH: x  Gluc: 148 mg/dL / Ketone: x  / Bili: x / Urobili: x   Blood: x / Protein: x / Nitrite: x   Leuk Esterase: x / RBC: x / WBC x   Sq Epi: x / Non Sq Epi: x / Bacteria: x        A: 69 yo male pmh prostate ca with prostatectomy and radiation presenting with hematuria. Now POD 1 S/P cytoscopy for bleeding. CBI running and urine output clear.     Plan:   -Continue CBI, monitor output  -trend h/h  -will continue to follow.

## 2023-07-13 NOTE — PROGRESS NOTE ADULT - ASSESSMENT
68 year old male with PMH HTN, CAD s/p PCI and prostate cancer s/p resection and XRT was transferred from [facility] for further care. As per patient he has been having hematuria since end April-beginning May when he presented to [facility] [place] [place] and then transferred to [facility] for hyperbaric Oxygen treatment. Intermittent bleeding and hospitalizations for hematuria and urinary retention.     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 No anticoagulation due to hematuria  Mobilize OOBTC    Dispo: in AM if urine remains clear after CBI clamped for 24 hours

## 2023-07-13 NOTE — DISCHARGE NOTE PROVIDER - HOSPITAL COURSE
68 year old male with PMH HTN, CAD s/p PCI and prostate cancer s/p resection and XRT was transferred from [facility] for further care. As per patient he has been having hematuria since end April-beginning May when he presented to [facility] [place] and then transferred to [facility] for hyperbaric Oxygen treatment. Intermittent bleeding and hospitalizations for hematuria and urinary retention. Failed CBI, s/p cystoscopy with urology on 7/12. Urine remains clear, Hg stable, cleared by urology for discharge. 68 year old male with PMH HTN, CAD s/p PCI and prostate cancer s/p resection and XRT was transferred from [facility] for further care. As per patient he has been having hematuria since end April-beginning May when he [place] [facility] [place] and then transferred to [facility] for hyperbaric Oxygen treatment. Intermittent bleeding and hospitalizations for hematuria and urinary retention. Failed CBI, s/p cystoscopy with urology on 7/12. Urine remains clear, Hg stable, cleared by urology for discharge. Per discussion with urology PA, no activity restriction. 68 year old male with PMH HTN, CAD s/p PCI and prostate cancer s/p resection and XRT was transferred from [facility] for further care. As per patient he has been having hematuria since end April-beginning May when he presented to [facility] [place] and then transferred to [facility] for hyperbaric Oxygen treatment. Intermittent bleeding and hospitalizations for hematuria and urinary retention. Failed CBI, s/p cystoscopy with urology on 7/12. Urine remains clear, Hg stable, cleared by urology for discharge. To bd dc with ASA and lower dose Brilinta given recurrent heamturia. Per discussion with urology PA, no activity restriction.

## 2023-07-13 NOTE — DISCHARGE NOTE PROVIDER - ATTENDING DISCHARGE PHYSICAL EXAMINATION:
VITALS:   T(C): 36.6 (07-14-23 @ 04:44), Max: 36.8 (07-13-23 @ 21:29)  HR: 69 (07-14-23 @ 04:44) (59 - 96)  BP: 116/69 (07-14-23 @ 04:44) (99/64 - 116/70)  RR: 18 (07-14-23 @ 04:44) (18 - 18)  SpO2: 98% (07-14-23 @ 04:44) (94% - 98%)    GENERAL: NAD, lying in bed comfortably  HEAD:  Atraumatic, Normocephalic  EYES: EOMI, PERRLA, conjunctiva and sclera clear  ENT: Moist mucous membranes  NECK: Supple, No JVD  CHEST/LUNG: Clear to auscultation bilaterally; No rales, rhonchi, wheezing, or rubs. Unlabored respirations  HEART: Regular rate and rhythm; No murmurs, rubs, or gallops  ABDOMEN: BSx4; Soft, nontender, nondistended  EXTREMITIES:  2+ Peripheral Pulses, brisk capillary refill. No clubbing, cyanosis, or edema  NERVOUS SYSTEM:  A&Ox3, no focal deficits   SKIN: No rashes or lesions  PSYCH: Normal affect, euthymic mood

## 2023-07-13 NOTE — DISCHARGE NOTE PROVIDER - NSDCFUSCHEDAPPT_GEN_ALL_CORE_FT
[facility]  CARDIOLOGY [address]  Scheduled Appointment: 07/20/2023    Devang Pierce  [facility]  CARDIOLOGY [address]  Scheduled Appointment: 08/08/2023

## 2023-07-13 NOTE — DISCHARGE NOTE PROVIDER - NSDCCPCAREPLAN_GEN_ALL_CORE_FT
PRINCIPAL DISCHARGE DIAGNOSIS  Diagnosis: Hematuria  Assessment and Plan of Treatment: F/u urology outpatient     PRINCIPAL DISCHARGE DIAGNOSIS  Diagnosis: Hematuria  Assessment and Plan of Treatment: F/u urology outpatient      SECONDARY DISCHARGE DIAGNOSES  Diagnosis: Radiation cystitis  Assessment and Plan of Treatment:

## 2023-07-13 NOTE — CHART NOTE - NSCHARTNOTEFT_GEN_A_CORE
H &P stable.   Pt. cleared for surgery.
Patient Tate removed without complication, no blood in the meatus. 100cc urine in bag.
Called by RN for patient with CBI, now at the lowest setting and clear for the past 3 hours   Discussed with urology PA, okay to clamp off CBI.  Discussed with RN to bladder scan patient if notice decreased flow or patient uncomfortable. Will restart CBI at that time   Will continue to monitor   RN to notify provider with any changes in patient status
Urology f/u:    Patient seen and examined at bedside this am.  Resting in bed with persistent pelvic discomfort and pain when he sits in a chair.    tolerating diet without n/v  Patient concerned each time we decrease CBI hematuria resumes.    Vital Signs Last 24 Hrs  T(C): 37 (11 Jul 2023 14:00), Max: 37.1 (10 Jul 2023 18:45)  T(F): 98.6 (11 Jul 2023 14:00), Max: 98.7 (10 Jul 2023 18:45)  HR: 61 (11 Jul 2023 14:00) (61 - 78)  BP: 110/72 (11 Jul 2023 14:00) (103/72 - 119/76)  RR: 18 (11 Jul 2023 14:00) (18 - 18)  SpO2: 98% (11 Jul 2023 14:00) (97% - 99%)  Parameters below as of 11 Jul 2023 14:00  Patient On (Oxygen Delivery Method): room air    : doyle in place - with cbi resumed over evening due to hematuria and he is concerned.  explained to the patient in depth present situation and continued care.  At times this event does not clear in one day and sometimes last for several, most likely due to cardiac meds ( anticoags. )   Urine running clear so stop CBI again this am as was on slower drip, watched several hours nutrse called and hematuria resumed so instructed resume CBI and titrate to clear.     Impression: hematuria with CBI  Discuss with Surgeon preswent situation and continued care and management.  Plan; Continue present care and management and follow.

## 2023-07-13 NOTE — DISCHARGE NOTE PROVIDER - NSDCMRMEDTOKEN_GEN_ALL_CORE_FT
aspirin 81 mg oral tablet: 1 tab(s) orally once a day  cefuroxime 500 mg oral tablet: 1 orally  gabapentin 300 mg oral capsule: 1 orally 3 times a day  losartan 25 mg oral tablet: 1 orally once a day  metoprolol succinate 100 mg oral tablet, extended release: 1 orally  pentosan polysulfate sodium 100 mg oral capsule: 1 orally 3 times a day  relugolix 120 mg oral tablet: 1 orally  spironolactone 25 mg oral tablet: 1 orally once a day  tamsulosin 0.4 mg oral capsule: 1 orally  ticagrelor 60 mg oral tablet: 1 tab(s) orally 2 times a day  trospium 20 mg oral tablet: 1 orally 2 times a day   aspirin 81 mg oral tablet: 1 tab(s) orally once a day  gabapentin 300 mg oral capsule: 1 orally 3 times a day  losartan 25 mg oral tablet: 1 orally once a day  metoprolol succinate 100 mg oral tablet, extended release: 1 tab(s) orally once a day  pentosan polysulfate sodium 100 mg oral capsule: 1 orally 3 times a day  relugolix 120 mg oral tablet: 1 orally  spironolactone 25 mg oral tablet: 1 orally once a day  tamsulosin 0.4 mg oral capsule: 1 tab(s) orally once a day  ticagrelor 60 mg oral tablet: 1 tab(s) orally 2 times a day  trospium 20 mg oral tablet: 1 orally 2 times a day   aspirin 81 mg oral tablet: 1 tab(s) orally once a day  Dilaudid 2 mg oral tablet: 1 tab(s) orally 3 times a day MDD: 3 tabs  gabapentin 300 mg oral capsule: 1 orally 3 times a day  losartan 25 mg oral tablet: 1 orally once a day  metoprolol succinate 100 mg oral tablet, extended release: 1 tab(s) orally once a day  naloxone 4 mg/0.1 mL nasal spray: 4 milligram(s) intranasally once a day as needed for overdose  pentosan polysulfate sodium 100 mg oral capsule: 1 orally 3 times a day  relugolix 120 mg oral tablet: 1 orally  spironolactone 25 mg oral tablet: 1 orally once a day  tamsulosin 0.4 mg oral capsule: 1 tab(s) orally once a day  ticagrelor 60 mg oral tablet: 1 tab(s) orally 2 times a day  trospium 20 mg oral tablet: 1 orally 2 times a day

## 2023-07-13 NOTE — PROGRESS NOTE ADULT - ASSESSMENT
68-year-old  gentleman with past medical history of prostate cancer, MI status post stent placement (on Brilinta), hematuria (underwent cystoscopy at [facility] and noted to have radiation cystitis), anemia, presenting with hematuria.  Patient follows up with Dr. Raza at [facility] for prostate cancer.  On Orgovyx.  PSMA PET scan on 3/16/23 was negative for any recurrence.  Patient received Injectafer 750 mg IV for iron deficiency on 6/8/23 and 6/15/23.    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 PRBC transfusion to keep hemoglobin more than 8.5 given the  coronary artery disease   - can send B12, folate, retic count, LDH common profile, ferritin    CAD with prior PCI (April 2023)  HTN  -  DAPT; patient had been advised to continue ASA and Brilinta at lower dose    - Toprol   - Losartan 25 QD  - Aldactone 25 QD     VTE Prophylaxis  - VCD. No anticoagulation due to hematuria   68-year-old  gentleman with past medical history of prostate cancer, MI status post stent placement (on Brilinta), hematuria (underwent cystoscopy at [facility] and noted to have radiation cystitis), anemia, presenting with hematuria.  Patient follows up with Dr. Raza at [facility] for prostate cancer.  On Orgovyx.  PSMA PET scan on 3/16/23 was negative for any recurrence.  Patient received Injectafer 750 mg IV for iron deficiency on 6/8/23 and 6/15/23.    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 PRBC transfusion to keep hemoglobin more than 8.0 given the  coronary artery disease     CAD with prior PCI (April 2023)  HTN  -  DAPT; patient had been advised to continue ASA and Brilinta at lower dose    - Toprol   - Losartan 25 QD  - Aldactone 25 QD     VTE Prophylaxis  - VCD. No anticoagulation due to hematuria

## 2023-07-13 NOTE — PROGRESS NOTE ADULT - SUBJECTIVE AND OBJECTIVE BOX
Mallika Hinkle M.D.    Patient is a 68y old  Male who presents with a chief complaint of Hematuria (13 Jul 2023 09:43)      SUBJECTIVE / OVERNIGHT EVENTS: s/p cystoscopy last night, urine output clear since.     Patient denies chest pain, SOB, abd pain, N/V, fever, chills, dysuria or any other complaints. All remainder ROS negative.     MEDICATIONS  (STANDING):  aspirin enteric coated 81 milliGRAM(s) Oral daily  gabapentin 300 milliGRAM(s) Oral three times a day  losartan 25 milliGRAM(s) Oral daily  metoprolol succinate  milliGRAM(s) Oral daily  RELUGOLIX (ORGOVYX) 120 milliGRAM(s) 1 Tablet(s) Oral daily  spironolactone 25 milliGRAM(s) Oral daily  tamsulosin 0.4 milliGRAM(s) Oral at bedtime  ticagrelor 60 milliGRAM(s) Oral every 12 hours  Trospium 20 MICROGram(s) 1 Tablet(s) Oral two times a day    MEDICATIONS  (PRN):  HYDROmorphone   Tablet 2 milliGRAM(s) Oral every 6 hours PRN Severe Pain (7 - 10)  melatonin 3 milliGRAM(s) Oral at bedtime PRN Sleep      I&O's Summary    12 Jul 2023 07:01  -  13 Jul 2023 07:00  --------------------------------------------------------  IN: 120 mL / OUT: 0 mL / NET: 120 mL        PHYSICAL EXAM:  Vital Signs Last 24 Hrs  T(C): 36.3 (13 Jul 2023 04:38), Max: 36.6 (12 Jul 2023 19:04)  T(F): 97.4 (13 Jul 2023 04:38), Max: 97.8 (12 Jul 2023 19:04)  HR: 79 (13 Jul 2023 04:38) (61 - 87)  BP: 116/72 (13 Jul 2023 04:38) (107/88 - 156/98)  BP(mean): 81 (12 Jul 2023 22:05) (81 - 118)  RR: 18 (13 Jul 2023 04:38) (12 - 20)  SpO2: 94% (13 Jul 2023 04:38) (94% - 100%)    Parameters below as of 13 Jul 2023 04:38  Patient On (Oxygen Delivery Method): room air      CONSTITUTIONAL: NAD, well-groomed  ENMT: Moist oral mucosa, no pharyngeal injection or exudates; normal dentition  RESPIRATORY: Normal respiratory effort; lungs are clear to auscultation bilaterally  CARDIOVASCULAR: Regular rate and rhythm; No lower extremity edema  ABDOMEN: Nontender to palpation, + Tate with light yellow urine  PSYCH: A+O x3; affect appropriate    LABS:                        12.2   6.99  )-----------( 276      ( 13 Jul 2023 05:24 )             36.0     07-13    137  |  101  |  13.3  ----------------------------<  148<H>  4.4   |  22.0  |  0.85    Ca    9.3      13 Jul 2023 05:24      PT/INR - ( 12 Jul 2023 06:51 )   PT: 12.3 sec;   INR: 1.06 ratio         PTT - ( 12 Jul 2023 06:51 )  PTT:34.1 sec      Urinalysis Basic - ( 13 Jul 2023 05:24 )    Color: x / Appearance: x / SG: x / pH: x  Gluc: 148 mg/dL / Ketone: x  / Bili: x / Urobili: x   Blood: x / Protein: x / Nitrite: x   Leuk Esterase: x / RBC: x / WBC x   Sq Epi: x / Non Sq Epi: x / Bacteria: x        CAPILLARY BLOOD GLUCOSE          RADIOLOGY & ADDITIONAL TESTS:  Results Reviewed:   Imaging Personally Reviewed:  Electrocardiogram Personally Reviewed:

## 2023-07-13 NOTE — PROGRESS NOTE ADULT - SUBJECTIVE AND OBJECTIVE BOX
68-year-old  gentleman with past medical history of prostate cancer, MI status post stent placement (on Brilinta), hematuria (underwent cystoscopy at [facility] and noted to have radiation cystitis), anemia, presenting with hematuria.  Patient follows up with Dr. Raza at [facility] for prostate cancer.  On Orgovyx.  PSMA PET scan on 3/16/23 was negative for any recurrence.  Patient received Injectafer 750 mg IV for iron deficiency on 6/8/23 and 6/15/23.    MEDICATIONS  (STANDING):  aspirin enteric coated 81 milliGRAM(s) Oral daily  gabapentin 300 milliGRAM(s) Oral three times a day  losartan 25 milliGRAM(s) Oral daily  metoprolol succinate  milliGRAM(s) Oral daily  RELUGOLIX (ORGOVYX) 120 milliGRAM(s) 1 Tablet(s) Oral daily  spironolactone 25 milliGRAM(s) Oral daily  tamsulosin 0.4 milliGRAM(s) Oral at bedtime  ticagrelor 60 milliGRAM(s) Oral every 12 hours  Trospium 20 MICROGram(s) 1 Tablet(s) Oral two times a day    MEDICATIONS  (PRN):  HYDROmorphone   Tablet 2 milliGRAM(s) Oral every 6 hours PRN Severe Pain (7 - 10)  melatonin 3 milliGRAM(s) Oral at bedtime PRN Sleep  CONSTITUTIONAL: NAD, well-groomed  ENMT: Moist oral mucosa, no pharyngeal injection or exudates; normal dentition  RESPIRATORY: Normal respiratory effort; lungs are clear to auscultation bilaterally  CARDIOVASCULAR: Regular rate and rhythm; No lower extremity edema  ABDOMEN: Nontender to palpation, + Tate with pink urine  PSYCH: A+O x3; affect appropriate    PHYSICAL EXAM:  Vital Signs Last 24 Hrs  T(C): 36.5 (12 Jul 2023 11:14), Max: 37 (11 Jul 2023 14:00)  T(F): 97.7 (12 Jul 2023 11:14), Max: 98.6 (11 Jul 2023 14:00)  HR: 71 (12 Jul 2023 11:14) (61 - 82)  BP: 127/84 (12 Jul 2023 11:14) (109/67 - 131/66)  BP(mean): --  RR: 18 (12 Jul 2023 11:14) (18 - 18)  SpO2: 98% (12 Jul 2023 11:14) (97% - 98%)    Parameters below as of 12 Jul 2023 11:14  Patient On (Oxygen Delivery Method): room air    CONSTITUTIONAL: NAD, well-groomed  ENMT: Moist oral mucosa, no pharyngeal injection or exudates; normal dentition  RESPIRATORY: Normal respiratory effort; lungs are clear to auscultation bilaterally  CARDIOVASCULAR: Regular rate and rhythm; No lower extremity edema  ABDOMEN: Nontender to palpation, + Tate with pink urine  PSYCH: A+O x3; affect appropriate    LABS:                        CBC:         12.2   6.99  )-----------( 276      ( 13 Jul 2023 05:24 )             36.0                 07-12    139  |  104  |  16.0  ----------------------------<  108<H>  4.3   |  22.0  |  0.89    Ca    9.0      12 Jul 2023 06:51      PT/INR - ( 12 Jul 2023 06:51 )   PT: 12.3 sec;   INR: 1.06 ratio         PTT - ( 12 Jul 2023 06:51 )  PTT:34.1 sec      Vital Signs Last 24 Hrs  T(C): 36.3 (07-13-23 @ 04:38), Max: 36.6 (07-12-23 @ 19:04)  T(F): 97.4 (07-13-23 @ 04:38), Max: 97.8 (07-12-23 @ 19:04)  HR: 79 (07-13-23 @ 04:38) (61 - 87)  BP: 116/72 (07-13-23 @ 04:38) (107/88 - 156/98)  BP(mean): 81 (07-12-23 @ 22:05) (81 - 118)  RR: 18 (07-13-23 @ 04:38) (12 - 20)  SpO2: 94% (07-13-23 @ 04:38) (94% - 100%)     68-year-old  gentleman with past medical history of prostate cancer, MI status post stent placement (on Brilinta), hematuria (underwent cystoscopy at [facility] and noted to have radiation cystitis), anemia, presenting with hematuria.  Patient follows up with Dr. Raza at [facility] for prostate cancer.  On Orgovyx.  PSMA PET scan on 3/16/23 was negative for any recurrence.  Patient received Injectafer 750 mg IV for iron deficiency on 6/8/23 and 6/15/23.  s/p cystoscopy 7/12 - c/w radiation cystitis     MEDICATIONS  (STANDING):  aspirin enteric coated 81 milliGRAM(s) Oral daily  gabapentin 300 milliGRAM(s) Oral three times a day  losartan 25 milliGRAM(s) Oral daily  metoprolol succinate  milliGRAM(s) Oral daily  RELUGOLIX (ORGOVYX) 120 milliGRAM(s) 1 Tablet(s) Oral daily  spironolactone 25 milliGRAM(s) Oral daily  tamsulosin 0.4 milliGRAM(s) Oral at bedtime  ticagrelor 60 milliGRAM(s) Oral every 12 hours  Trospium 20 MICROGram(s) 1 Tablet(s) Oral two times a day    MEDICATIONS  (PRN):  HYDROmorphone   Tablet 2 milliGRAM(s) Oral every 6 hours PRN Severe Pain (7 - 10)  melatonin 3 milliGRAM(s) Oral at bedtime PRN Sleep  CONSTITUTIONAL: NAD, well-groomed  ENMT: Moist oral mucosa, no pharyngeal injection or exudates; normal dentition  RESPIRATORY: Normal respiratory effort; lungs are clear to auscultation bilaterally  CARDIOVASCULAR: Regular rate and rhythm; No lower extremity edema  ABDOMEN: Nontender to palpation, + Tate with pink urine  PSYCH: A+O x3; affect appropriate    PHYSICAL EXAM:  Vital Signs Last 24 Hrs  T(C): 36.5 (12 Jul 2023 11:14), Max: 37 (11 Jul 2023 14:00)  T(F): 97.7 (12 Jul 2023 11:14), Max: 98.6 (11 Jul 2023 14:00)  HR: 71 (12 Jul 2023 11:14) (61 - 82)  BP: 127/84 (12 Jul 2023 11:14) (109/67 - 131/66)  BP(mean): --  RR: 18 (12 Jul 2023 11:14) (18 - 18)  SpO2: 98% (12 Jul 2023 11:14) (97% - 98%)    Parameters below as of 12 Jul 2023 11:14  Patient On (Oxygen Delivery Method): room air    CONSTITUTIONAL: NAD, well-groomed  ENMT: Moist oral mucosa, no pharyngeal injection or exudates; normal dentition  RESPIRATORY: Normal respiratory effort; lungs are clear to auscultation bilaterally  CARDIOVASCULAR: Regular rate and rhythm; No lower extremity edema  ABDOMEN: Nontender to palpation, + Tate with pink urine  PSYCH: A+O x3; affect appropriate    LABS:                        CBC:         12.2   6.99  )-----------( 276      ( 13 Jul 2023 05:24 )             36.0                 07-12    139  |  104  |  16.0  ----------------------------<  108<H>  4.3   |  22.0  |  0.89    Ca    9.0      12 Jul 2023 06:51      PT/INR - ( 12 Jul 2023 06:51 )   PT: 12.3 sec;   INR: 1.06 ratio         PTT - ( 12 Jul 2023 06:51 )  PTT:34.1 sec      Vital Signs Last 24 Hrs  T(C): 36.3 (07-13-23 @ 04:38), Max: 36.6 (07-12-23 @ 19:04)  T(F): 97.4 (07-13-23 @ 04:38), Max: 97.8 (07-12-23 @ 19:04)  HR: 79 (07-13-23 @ 04:38) (61 - 87)  BP: 116/72 (07-13-23 @ 04:38) (107/88 - 156/98)  BP(mean): 81 (07-12-23 @ 22:05) (81 - 118)  RR: 18 (07-13-23 @ 04:38) (12 - 20)  SpO2: 94% (07-13-23 @ 04:38) (94% - 100%)

## 2023-07-14 ENCOUNTER — TRANSCRIPTION ENCOUNTER (OUTPATIENT)
Age: 68
End: 2023-07-14

## 2023-07-14 VITALS
TEMPERATURE: 98 F | OXYGEN SATURATION: 99 % | HEART RATE: 76 BPM | RESPIRATION RATE: 18 BRPM | DIASTOLIC BLOOD PRESSURE: 72 MMHG | SYSTOLIC BLOOD PRESSURE: 111 MMHG

## 2023-07-14 PROCEDURE — 82728 ASSAY OF FERRITIN: CPT

## 2023-07-14 PROCEDURE — 86901 BLOOD TYPING SEROLOGIC RH(D): CPT

## 2023-07-14 PROCEDURE — 86803 HEPATITIS C AB TEST: CPT

## 2023-07-14 PROCEDURE — 85027 COMPLETE CBC AUTOMATED: CPT

## 2023-07-14 PROCEDURE — 80048 BASIC METABOLIC PNL TOTAL CA: CPT

## 2023-07-14 PROCEDURE — 74177 CT ABD & PELVIS W/CONTRAST: CPT

## 2023-07-14 PROCEDURE — 83540 ASSAY OF IRON: CPT

## 2023-07-14 PROCEDURE — 85610 PROTHROMBIN TIME: CPT

## 2023-07-14 PROCEDURE — 85025 COMPLETE CBC W/AUTO DIFF WBC: CPT

## 2023-07-14 PROCEDURE — 83550 IRON BINDING TEST: CPT

## 2023-07-14 PROCEDURE — 99239 HOSP IP/OBS DSCHRG MGMT >30: CPT

## 2023-07-14 PROCEDURE — 86900 BLOOD TYPING SEROLOGIC ABO: CPT

## 2023-07-14 PROCEDURE — 86850 RBC ANTIBODY SCREEN: CPT

## 2023-07-14 PROCEDURE — 36415 COLL VENOUS BLD VENIPUNCTURE: CPT

## 2023-07-14 PROCEDURE — 80053 COMPREHEN METABOLIC PANEL: CPT

## 2023-07-14 PROCEDURE — 85730 THROMBOPLASTIN TIME PARTIAL: CPT

## 2023-07-14 PROCEDURE — 84466 ASSAY OF TRANSFERRIN: CPT

## 2023-07-14 RX ORDER — METOPROLOL TARTRATE 50 MG
1 TABLET ORAL
Qty: 0 | Refills: 0 | DISCHARGE

## 2023-07-14 RX ORDER — HYDROMORPHONE HYDROCHLORIDE 2 MG/ML
1 INJECTION INTRAMUSCULAR; INTRAVENOUS; SUBCUTANEOUS
Qty: 9 | Refills: 0
Start: 2023-07-14 | End: 2023-07-16

## 2023-07-14 RX ORDER — TICAGRELOR 90 MG/1
1 TABLET ORAL
Refills: 0 | DISCHARGE

## 2023-07-14 RX ORDER — NALOXONE HYDROCHLORIDE 4 MG/.1ML
4 SPRAY NASAL
Qty: 1 | Refills: 0
Start: 2023-07-14 | End: 2023-07-14

## 2023-07-14 RX ORDER — TAMSULOSIN HYDROCHLORIDE 0.4 MG/1
1 CAPSULE ORAL
Qty: 0 | Refills: 0 | DISCHARGE

## 2023-07-14 RX ORDER — ERTAPENEM SODIUM 1 G/1
1000 INJECTION, POWDER, LYOPHILIZED, FOR SOLUTION INTRAMUSCULAR; INTRAVENOUS EVERY 24 HOURS
Refills: 0 | Status: DISCONTINUED | OUTPATIENT
Start: 2023-07-14 | End: 2023-07-14

## 2023-07-14 RX ORDER — TICAGRELOR 90 MG/1
1 TABLET ORAL
Qty: 60 | Refills: 2
Start: 2023-07-14 | End: 2023-10-11

## 2023-07-14 RX ORDER — CEFUROXIME AXETIL 250 MG
1 TABLET ORAL
Refills: 0 | DISCHARGE

## 2023-07-14 RX ORDER — TICAGRELOR 90 MG/1
1 TABLET ORAL
Qty: 60 | Refills: 0
Start: 2023-07-14 | End: 2023-08-12

## 2023-07-14 RX ADMIN — HYDROMORPHONE HYDROCHLORIDE 2 MILLIGRAM(S): 2 INJECTION INTRAMUSCULAR; INTRAVENOUS; SUBCUTANEOUS at 13:08

## 2023-07-14 RX ADMIN — ERTAPENEM SODIUM 120 MILLIGRAM(S): 1 INJECTION, POWDER, LYOPHILIZED, FOR SOLUTION INTRAMUSCULAR; INTRAVENOUS at 13:52

## 2023-07-14 RX ADMIN — Medication 81 MILLIGRAM(S): at 11:20

## 2023-07-14 RX ADMIN — GABAPENTIN 300 MILLIGRAM(S): 400 CAPSULE ORAL at 13:01

## 2023-07-14 RX ADMIN — GABAPENTIN 300 MILLIGRAM(S): 400 CAPSULE ORAL at 06:49

## 2023-07-14 RX ADMIN — HYDROMORPHONE HYDROCHLORIDE 2 MILLIGRAM(S): 2 INJECTION INTRAMUSCULAR; INTRAVENOUS; SUBCUTANEOUS at 00:59

## 2023-07-14 RX ADMIN — SPIRONOLACTONE 25 MILLIGRAM(S): 25 TABLET, FILM COATED ORAL at 06:49

## 2023-07-14 RX ADMIN — HYDROMORPHONE HYDROCHLORIDE 2 MILLIGRAM(S): 2 INJECTION INTRAMUSCULAR; INTRAVENOUS; SUBCUTANEOUS at 14:10

## 2023-07-14 RX ADMIN — TICAGRELOR 60 MILLIGRAM(S): 90 TABLET ORAL at 06:49

## 2023-07-14 RX ADMIN — Medication 100 MILLIGRAM(S): at 13:01

## 2023-07-14 RX ADMIN — LOSARTAN POTASSIUM 25 MILLIGRAM(S): 100 TABLET, FILM COATED ORAL at 06:49

## 2023-07-14 NOTE — PROGRESS NOTE ADULT - PROVIDER SPECIALTY LIST ADULT
Urology
Hospitalist
Urology
Heme/Onc
Heme/Onc
Hospitalist

## 2023-07-14 NOTE — PROGRESS NOTE ADULT - REASON FOR ADMISSION
Hematuria

## 2023-07-14 NOTE — PROGRESS NOTE ADULT - SUBJECTIVE AND OBJECTIVE BOX
UROLOGY F/U:  POD# 2 s/p cystoscopy with fulguration    Patient seen and examined at bedside, awake, alert, responsive resting in bed states voiding only few times little blood - but has some urinary incontinence ( moderate )     Vital Signs Last 24 Hrs  T(C): 36.6 (14 Jul 2023 04:44), Max: 36.8 (13 Jul 2023 21:29)  T(F): 97.8 (14 Jul 2023 04:44), Max: 98.2 (13 Jul 2023 21:29)  HR: 69 (14 Jul 2023 04:44) (59 - 96)  BP: 116/69 (14 Jul 2023 04:44) (99/64 - 116/70)  RR: 18 (14 Jul 2023 04:44) (18 - 18)  SpO2: 98% (14 Jul 2023 04:44) (94% - 98%)  Parameters below as of 14 Jul 2023 04:44  Patient On (Oxygen Delivery Method): room air    :  incontinent of urine but says only few times had blood noted mostly yellow, , minimal burning during urination.  Slowly improving     Labs:                        12.2   6.99  )-----------( 276      ( 13 Jul 2023 05:24 )             36.0     07-13    137  |  101  |  13.3  ----------------------------<  148<H>  4.4   |  22.0  |  0.85      Impression: stable radiation cystitis -stable post-op.   POD# 2 s/p cystoscopy, voiding  ( passed TOV ) little hematuria intermittently, mild burning - states incontinence little bother some.  Discuss with Surgeon present situation and continued care and management  Plan: Continue present care and management follow the incontinence.  Continue present care and management- will follow , general care as per medicine.

## 2023-07-14 NOTE — PROGRESS NOTE ADULT - SUBJECTIVE AND OBJECTIVE BOX
68-year-old  gentleman with past medical history of prostate cancer, MI status post stent placement (on Brilinta), hematuria (underwent cystoscopy at [facility] and noted to have radiation cystitis), anemia, presenting with hematuria.  Patient follows up with Dr. Raza at [facility] for prostate cancer.  On Orgovyx.  PSMA PET scan on 3/16/23 was negative for any recurrence.  Patient received Injectafer 750 mg IV for iron deficiency on 6/8/23 and 6/15/23.  s/p cystoscopy 7/12 - c/w radiation cystitis   seen at bedside, no acute events, occasional blood in urine    MEDICATIONS  (STANDING):  aspirin enteric coated 81 milliGRAM(s) Oral daily  gabapentin 300 milliGRAM(s) Oral three times a day  losartan 25 milliGRAM(s) Oral daily  metoprolol succinate  milliGRAM(s) Oral daily  RELUGOLIX (ORGOVYX) 120 milliGRAM(s) 1 Tablet(s) Oral daily  spironolactone 25 milliGRAM(s) Oral daily  tamsulosin 0.4 milliGRAM(s) Oral at bedtime  ticagrelor 60 milliGRAM(s) Oral every 12 hours  Trospium 20 MICROGram(s) 1 Tablet(s) Oral two times a day    MEDICATIONS  (PRN):  HYDROmorphone   Tablet 2 milliGRAM(s) Oral every 6 hours PRN Severe Pain (7 - 10)  melatonin 3 milliGRAM(s) Oral at bedtime PRN Sleep  CONSTITUTIONAL: NAD, well-groomed  ENMT: Moist oral mucosa, no pharyngeal injection or exudates; normal dentition  RESPIRATORY: Normal respiratory effort; lungs are clear to auscultation bilaterally  CARDIOVASCULAR: Regular rate and rhythm; No lower extremity edema  ABDOMEN: Nontender to palpation, + Tate with pink urine  PSYCH: A+O x3; affect appropriate    ICU Vital Signs Last 24 Hrs  T(C): 36.6 (14 Jul 2023 04:44), Max: 36.8 (13 Jul 2023 21:29)  T(F): 97.8 (14 Jul 2023 04:44), Max: 98.2 (13 Jul 2023 21:29)  HR: 69 (14 Jul 2023 04:44) (59 - 96)  BP: 116/69 (14 Jul 2023 04:44) (99/64 - 116/70)  BP(mean): --  ABP: --  ABP(mean): --  RR: 18 (14 Jul 2023 04:44) (18 - 18)  SpO2: 98% (14 Jul 2023 04:44) (94% - 98%)    O2 Parameters below as of 14 Jul 2023 04:44  Patient On (Oxygen Delivery Method): room air    CONSTITUTIONAL: NAD, well-groomed  ENMT: Moist oral mucosa, no pharyngeal injection or exudates; normal dentition  RESPIRATORY: Normal respiratory effort; lungs are clear to auscultation bilaterally  CARDIOVASCULAR: Regular rate and rhythm; No lower extremity edema  ABDOMEN: Nontender to palpation, + Tate with pink urine  PSYCH: A+O x3; affect appropriate    LABS:                          12.2   6.99  )-----------( 276      ( 13 Jul 2023 05:24 )             36.0                           CBC:         12.2   6.99  )-----------( 276      ( 13 Jul 2023 05:24 )             36.0                 07-12    139  |  104  |  16.0  ----------------------------<  108<H>  4.3   |  22.0  |  0.89    Ca    9.0      12 Jul 2023 06:51      PT/INR - ( 12 Jul 2023 06:51 )   PT: 12.3 sec;   INR: 1.06 ratio         PTT - ( 12 Jul 2023 06:51 )  PTT:34.1 sec

## 2023-07-14 NOTE — PROGRESS NOTE ADULT - ASSESSMENT
68-year-old  gentleman with past medical history of prostate cancer, MI status post stent placement (on Brilinta), hematuria (underwent cystoscopy at [facility] and noted to have radiation cystitis), anemia, presenting with hematuria.  Patient follows up with Dr. Raza at [facility] for prostate cancer.  On Orgovyx.  PSMA PET scan on 3/16/23 was negative for any recurrence.  Patient received Injectafer 750 mg IV for iron deficiency on 6/8/23 and 6/15/23.    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 PRBC transfusion to keep hemoglobin more than 8.0 given the  coronary artery disease     CAD with prior PCI (April 2023)  HTN  -  DAPT; patient had been advised to continue ASA and Brilinta at lower dose    - Toprol   - Losartan 25 QD  - Aldactone 25 QD     VTE Prophylaxis  - VCD. No anticoagulation due to hematuria

## 2023-07-14 NOTE — DISCHARGE NOTE NURSING/CASE MANAGEMENT/SOCIAL WORK - PATIENT PORTAL LINK FT
You can access the FollowMyHealth Patient Portal offered by [facility] by registering at the following website: [email or web address]. By joining [facility]’s FollowMyHealth portal, you will also be able to view your health information using other applications (apps) compatible with our system.

## 2023-07-20 ENCOUNTER — APPOINTMENT (OUTPATIENT)
Dept: CARDIOLOGY | Facility: CLINIC | Age: 68
End: 2023-07-20
Payer: MEDICARE

## 2023-07-20 PROCEDURE — 93306 TTE W/DOPPLER COMPLETE: CPT

## 2023-07-28 PROBLEM — C61 MALIGNANT NEOPLASM OF PROSTATE: Chronic | Status: ACTIVE | Noted: 2023-07-08

## 2023-07-28 PROBLEM — I25.10 ATHEROSCLEROTIC HEART DISEASE OF NATIVE CORONARY ARTERY WITHOUT ANGINA PECTORIS: Chronic | Status: ACTIVE | Noted: 2023-07-08

## 2023-08-07 ENCOUNTER — APPOINTMENT (OUTPATIENT)
Dept: ELECTROPHYSIOLOGY | Facility: CLINIC | Age: 68
End: 2023-08-07

## 2023-08-08 ENCOUNTER — APPOINTMENT (OUTPATIENT)
Dept: CARDIOLOGY | Facility: CLINIC | Age: 68
End: 2023-08-08

## 2023-09-14 ENCOUNTER — APPOINTMENT (OUTPATIENT)
Dept: CARDIOLOGY | Facility: CLINIC | Age: 68
End: 2023-09-14
Payer: MEDICARE

## 2023-09-14 VITALS
DIASTOLIC BLOOD PRESSURE: 84 MMHG | WEIGHT: 247 LBS | BODY MASS INDEX: 30.71 KG/M2 | SYSTOLIC BLOOD PRESSURE: 120 MMHG | HEART RATE: 68 BPM | OXYGEN SATURATION: 100 % | HEIGHT: 75 IN

## 2023-09-14 PROCEDURE — 99215 OFFICE O/P EST HI 40 MIN: CPT

## 2023-09-14 RX ORDER — TROSPIUM CHLORIDE 20 MG/1
20 TABLET, FILM COATED ORAL
Qty: 180 | Refills: 3 | Status: DISCONTINUED | COMMUNITY
Start: 2023-06-07 | End: 2023-09-14

## 2023-09-14 RX ORDER — ALBUTEROL SULFATE 90 UG/1
108 (90 BASE) INHALANT RESPIRATORY (INHALATION)
Qty: 8 | Refills: 0 | Status: DISCONTINUED | COMMUNITY
Start: 2022-12-06 | End: 2023-09-14

## 2023-09-14 RX ORDER — METHYLPREDNISOLONE 4 MG/1
4 TABLET ORAL
Qty: 21 | Refills: 0 | Status: DISCONTINUED | COMMUNITY
Start: 2022-12-06 | End: 2023-09-14

## 2023-09-14 RX ORDER — TICAGRELOR 90 MG/1
90 TABLET ORAL TWICE DAILY
Qty: 180 | Refills: 3 | Status: DISCONTINUED | COMMUNITY
Start: 2023-04-25 | End: 2023-09-14

## 2023-09-14 RX ORDER — FLUCONAZOLE 100 MG/1
100 TABLET ORAL
Qty: 10 | Refills: 0 | Status: DISCONTINUED | COMMUNITY
Start: 2023-01-23 | End: 2023-09-14

## 2023-09-14 RX ORDER — SULFAMETHOXAZOLE AND TRIMETHOPRIM 800; 160 MG/1; MG/1
800-160 TABLET ORAL
Qty: 20 | Refills: 0 | Status: DISCONTINUED | COMMUNITY
Start: 2022-12-31 | End: 2023-09-14

## 2023-09-14 RX ORDER — BENZONATATE 200 MG/1
200 CAPSULE ORAL
Qty: 20 | Refills: 0 | Status: DISCONTINUED | COMMUNITY
Start: 2022-12-06 | End: 2023-09-14

## 2023-09-14 RX ORDER — CIPROFLOXACIN HYDROCHLORIDE 500 MG/1
500 TABLET, FILM COATED ORAL
Refills: 0 | Status: DISCONTINUED | COMMUNITY
End: 2023-09-14

## 2023-09-14 RX ORDER — PENTOSAN POLYSULFATE SODIUM 100 MG/1
100 CAPSULE, GELATIN COATED ORAL 3 TIMES DAILY
Qty: 270 | Refills: 3 | Status: DISCONTINUED | COMMUNITY
Start: 2023-06-07 | End: 2023-09-14

## 2023-09-14 RX ORDER — CIPROFLOXACIN HYDROCHLORIDE 500 MG/1
500 TABLET, FILM COATED ORAL
Qty: 20 | Refills: 0 | Status: DISCONTINUED | COMMUNITY
Start: 2023-05-27 | End: 2023-09-14

## 2023-09-14 RX ORDER — AMOXICILLIN AND CLAVULANATE POTASSIUM 875; 125 MG/1; MG/1
875-125 TABLET, COATED ORAL
Qty: 20 | Refills: 0 | Status: DISCONTINUED | COMMUNITY
Start: 2022-12-22 | End: 2023-09-14

## 2023-09-14 RX ORDER — HYDROMORPHONE HYDROCHLORIDE 4 MG/1
4 TABLET ORAL
Qty: 12 | Refills: 0 | Status: DISCONTINUED | COMMUNITY
Start: 2023-05-19 | End: 2023-09-14

## 2023-09-14 RX ORDER — CEFDINIR 300 MG/1
300 CAPSULE ORAL
Qty: 14 | Refills: 0 | Status: DISCONTINUED | COMMUNITY
Start: 2023-05-20 | End: 2023-09-14

## 2023-09-14 RX ORDER — SPIRONOLACTONE 25 MG/1
25 TABLET ORAL
Qty: 90 | Refills: 3 | Status: ACTIVE | COMMUNITY
Start: 2023-04-25 | End: 1900-01-01

## 2023-09-14 RX ORDER — GABAPENTIN 300 MG/1
300 CAPSULE ORAL 3 TIMES DAILY
Qty: 270 | Refills: 1 | Status: DISCONTINUED | COMMUNITY
Start: 2023-06-07 | End: 2023-09-14

## 2023-09-14 RX ORDER — AZITHROMYCIN 250 MG/1
250 TABLET, FILM COATED ORAL
Qty: 6 | Refills: 0 | Status: DISCONTINUED | COMMUNITY
Start: 2022-12-06 | End: 2023-09-14

## 2023-09-22 ENCOUNTER — RX RENEWAL (OUTPATIENT)
Age: 68
End: 2023-09-22

## 2023-11-14 ENCOUNTER — NON-APPOINTMENT (OUTPATIENT)
Age: 68
End: 2023-11-14

## 2024-01-12 RX ORDER — METOPROLOL SUCCINATE 100 MG/1
100 TABLET, EXTENDED RELEASE ORAL DAILY
Qty: 90 | Refills: 3 | Status: ACTIVE | COMMUNITY
Start: 2023-04-25 | End: 1900-01-01

## 2024-02-10 ENCOUNTER — RX RENEWAL (OUTPATIENT)
Age: 69
End: 2024-02-10

## 2024-03-19 ENCOUNTER — APPOINTMENT (OUTPATIENT)
Dept: CARDIOLOGY | Facility: CLINIC | Age: 69
End: 2024-03-19
Payer: MEDICARE

## 2024-03-19 ENCOUNTER — NON-APPOINTMENT (OUTPATIENT)
Age: 69
End: 2024-03-19

## 2024-03-19 VITALS
OXYGEN SATURATION: 100 % | DIASTOLIC BLOOD PRESSURE: 80 MMHG | BODY MASS INDEX: 33.57 KG/M2 | SYSTOLIC BLOOD PRESSURE: 130 MMHG | HEART RATE: 67 BPM | WEIGHT: 270 LBS | HEIGHT: 75 IN

## 2024-03-19 DIAGNOSIS — T46.6X5A ADVERSE EFFECT OF ANTIHYPERLIPIDEMIC AND ANTIARTERIOSCLEROTIC DRUGS, INITIAL ENCOUNTER: ICD-10-CM

## 2024-03-19 PROCEDURE — 93000 ELECTROCARDIOGRAM COMPLETE: CPT

## 2024-03-19 PROCEDURE — 99215 OFFICE O/P EST HI 40 MIN: CPT

## 2024-03-19 PROCEDURE — G2211 COMPLEX E/M VISIT ADD ON: CPT

## 2024-03-19 NOTE — PHYSICAL EXAM
[Well Nourished] : well nourished [Well Developed] : well developed [No Acute Distress] : no acute distress [Normal Conjunctiva] : normal conjunctiva [Normal Venous Pressure] : normal venous pressure [No Carotid Bruit] : no carotid bruit [Normal S1, S2] : normal S1, S2 [No Murmur] : no murmur [No Rub] : no rub [No Gallop] : no gallop [Good Air Entry] : good air entry [Clear Lung Fields] : clear lung fields [No Respiratory Distress] : no respiratory distress  [Soft] : abdomen soft [Non Tender] : non-tender [No Masses/organomegaly] : no masses/organomegaly [Normal Bowel Sounds] : normal bowel sounds [Normal Gait] : normal gait [No Cyanosis] : no cyanosis [No Edema] : no edema [No Clubbing] : no clubbing [No Varicosities] : no varicosities [No Rash] : no rash [Moves all extremities] : moves all extremities [No Skin Lesions] : no skin lesions [No Focal Deficits] : no focal deficits [Normal Speech] : normal speech [Normal] : alert and oriented, normal memory [Alert and Oriented] : alert and oriented [Normal memory] : normal memory

## 2024-03-19 NOTE — REASON FOR VISIT
[Hyperlipidemia] : hyperlipidemia [Hypertension] : hypertension [Coronary Artery Disease] : coronary artery disease [FreeTextEntry1] : I saw this 68-year-old man in follow-up consultation on  03/19/24 He presented to the hospital on 04/21/23 with an anterior wall myocardial infarction and had stenting of a complex LAD diagonal lesion.  He had a reduced EF, had 48 hours of reperfusion arrhythmias, but seems to have settled down on beta-blockers aspirin statins Brilinta and aspirin. Recent follow-up echo shows a normal ejection fraction of 75%. He comes in for follow-up with no complaints from a cardiac  point of view.  However he has had a hard time with an upper respiratory tract infection with a chronic cough lasting many weeks.  This seems to be resolving.  He has been inactive and put on 30 pounds in weight.

## 2024-03-19 NOTE — DISCUSSION/SUMMARY
[FreeTextEntry1] : He will remain on all of his medications, aspirin, Brilinta was d/c,  beta-blockers statins.  Because of his past history of statin intolerance I will start the process for prescribing Repatha.  which he now is taking. I  scheduled an echo  to reassess LV  function which showed restoration back to normal F/u in 6 months EKG today is unchanged He will follow-up with me in 3 to 4 months and hopefully try and lose some weight. [EKG obtained to assist in diagnosis and management of assessed problem(s)] : EKG obtained to assist in diagnosis and management of assessed problem(s)

## 2024-04-18 RX ORDER — EVOLOCUMAB 140 MG/ML
140 INJECTION, SOLUTION SUBCUTANEOUS
Qty: 2 | Refills: 6 | Status: ACTIVE | COMMUNITY
Start: 2023-04-25

## 2024-05-02 ENCOUNTER — APPOINTMENT (OUTPATIENT)
Dept: CARDIOLOGY | Facility: CLINIC | Age: 69
End: 2024-05-02
Payer: MEDICARE

## 2024-05-02 VITALS
HEIGHT: 75 IN | OXYGEN SATURATION: 100 % | WEIGHT: 270 LBS | SYSTOLIC BLOOD PRESSURE: 140 MMHG | BODY MASS INDEX: 33.57 KG/M2 | HEART RATE: 69 BPM | DIASTOLIC BLOOD PRESSURE: 82 MMHG

## 2024-05-02 DIAGNOSIS — T46.6X5A ADVERSE EFFECT OF ANTIHYPERLIPIDEMIC AND ANTIARTERIOSCLEROTIC DRUGS, INITIAL ENCOUNTER: ICD-10-CM

## 2024-05-02 DIAGNOSIS — Z00.00 ENCOUNTER FOR GENERAL ADULT MEDICAL EXAMINATION W/OUT ABNORMAL FINDINGS: ICD-10-CM

## 2024-05-02 DIAGNOSIS — Z78.9 OTHER SPECIFIED HEALTH STATUS: ICD-10-CM

## 2024-05-02 PROCEDURE — 99214 OFFICE O/P EST MOD 30 MIN: CPT

## 2024-05-02 PROCEDURE — G2211 COMPLEX E/M VISIT ADD ON: CPT

## 2024-05-02 RX ORDER — LOSARTAN POTASSIUM 50 MG/1
50 TABLET, FILM COATED ORAL DAILY
Qty: 90 | Refills: 3 | Status: ACTIVE | COMMUNITY
Start: 2023-04-25 | End: 1900-01-01

## 2024-05-02 RX ORDER — MIRABEGRON 50 MG/1
50 TABLET, FILM COATED, EXTENDED RELEASE ORAL DAILY
Refills: 0 | Status: ACTIVE | COMMUNITY

## 2024-05-02 NOTE — REASON FOR VISIT
[Hyperlipidemia] : hyperlipidemia [Hypertension] : hypertension [Coronary Artery Disease] : coronary artery disease [FreeTextEntry1] : I saw this 68-year-old man in follow-up consultation on  05/02/24 He presented to the hospital on 04/21/23 with an anterior wall myocardial infarction and had stenting of a complex LAD diagonal lesion.  He had a reduced EF, had 48 hours of reperfusion arrhythmias, but seems to have settled down on beta-blockers aspirin statins Brilinta and aspirin. Recent follow-up echo shows a normal ejection fraction of 75%. He comes in for follow-up with no complaints from a cardiac  point of view.  However he has had a hard time with an upper respiratory tract infection with a chronic cough lasting many weeks.  This seems to be resolving.  He has been inactive and put on 30 pounds in weight. He is concerned because he is short of breath and not feeling well and noticed that in his antihypertensive bottle there were 3 different shape size medications supposedly oral losartan.

## 2024-05-02 NOTE — DISCUSSION/SUMMARY
[FreeTextEntry1] : He will remain on all of his medications, aspirin, Brilinta was d/c,  beta-blockers statins.  Because of his past history of statin intolerance I will start the process for prescribing Repatha.  which he now is taking. I  scheduled an echo  to reassess LV  function which showed restoration back to normal F/u in 6 months EKG today is unchanged He will follow-up with me in 3 to 4 months and hopefully try and lose some weight. He will get a repeat echo to assess LV function, He will remain on losartan 50 mg daily and Aldactone 25 mg daily.

## 2024-05-02 NOTE — HISTORY OF PRESENT ILLNESS
[FreeTextEntry1] : he has no chest pain He has  shortness of breath He has no palpitations He has no syncope He is neurologically intact He has no edema He has no GI symptoms

## 2024-05-02 NOTE — PHYSICAL EXAM
[Well Developed] : well developed [Well Nourished] : well nourished [No Acute Distress] : no acute distress [Normal Conjunctiva] : normal conjunctiva [Normal Venous Pressure] : normal venous pressure [No Carotid Bruit] : no carotid bruit [Normal S1, S2] : normal S1, S2 [No Murmur] : no murmur [No Rub] : no rub [No Gallop] : no gallop [Clear Lung Fields] : clear lung fields [Good Air Entry] : good air entry [No Respiratory Distress] : no respiratory distress  [Soft] : abdomen soft [No Masses/organomegaly] : no masses/organomegaly [Non Tender] : non-tender [Normal Bowel Sounds] : normal bowel sounds [Normal Gait] : normal gait [No Edema] : no edema [No Cyanosis] : no cyanosis [No Clubbing] : no clubbing [No Varicosities] : no varicosities [No Rash] : no rash [No Skin Lesions] : no skin lesions [Moves all extremities] : moves all extremities [No Focal Deficits] : no focal deficits [Normal Speech] : normal speech [Normal] : alert and oriented, normal memory [Alert and Oriented] : alert and oriented [Normal memory] : normal memory

## 2024-06-07 ENCOUNTER — APPOINTMENT (OUTPATIENT)
Dept: CARDIOLOGY | Facility: CLINIC | Age: 69
End: 2024-06-07
Payer: MEDICARE

## 2024-06-07 PROCEDURE — 93306 TTE W/DOPPLER COMPLETE: CPT

## 2024-06-13 ENCOUNTER — APPOINTMENT (OUTPATIENT)
Dept: CARDIOLOGY | Facility: CLINIC | Age: 69
End: 2024-06-13
Payer: MEDICARE

## 2024-06-13 VITALS
OXYGEN SATURATION: 97 % | WEIGHT: 270 LBS | HEIGHT: 75 IN | HEART RATE: 57 BPM | DIASTOLIC BLOOD PRESSURE: 66 MMHG | SYSTOLIC BLOOD PRESSURE: 110 MMHG | BODY MASS INDEX: 33.57 KG/M2

## 2024-06-13 DIAGNOSIS — Z95.5 PRESENCE OF CORONARY ANGIOPLASTY IMPLANT AND GRAFT: ICD-10-CM

## 2024-06-13 DIAGNOSIS — Z78.9 OTHER SPECIFIED HEALTH STATUS: ICD-10-CM

## 2024-06-13 DIAGNOSIS — E11.9 TYPE 2 DIABETES MELLITUS W/OUT COMPLICATIONS: ICD-10-CM

## 2024-06-13 DIAGNOSIS — C61 MALIGNANT NEOPLASM OF PROSTATE: ICD-10-CM

## 2024-06-13 DIAGNOSIS — E78.5 HYPERLIPIDEMIA, UNSPECIFIED: ICD-10-CM

## 2024-06-13 DIAGNOSIS — I25.2 OLD MYOCARDIAL INFARCTION: ICD-10-CM

## 2024-06-13 DIAGNOSIS — N30.40 IRRADIATION CYSTITIS W/OUT HEMATURIA: ICD-10-CM

## 2024-06-13 PROCEDURE — 99215 OFFICE O/P EST HI 40 MIN: CPT

## 2024-06-13 PROCEDURE — G2211 COMPLEX E/M VISIT ADD ON: CPT

## 2024-06-13 RX ORDER — METFORMIN ER 500 MG 500 MG/1
500 TABLET ORAL DAILY
Qty: 60 | Refills: 3 | Status: ACTIVE | COMMUNITY
Start: 2024-06-13 | End: 1900-01-01

## 2024-06-13 RX ORDER — DULOXETINE HYDROCHLORIDE 40 MG/1
40 CAPSULE, DELAYED RELEASE PELLETS ORAL DAILY
Refills: 0 | Status: DISCONTINUED | COMMUNITY
End: 2024-06-13

## 2024-06-13 NOTE — DISCUSSION/SUMMARY
[FreeTextEntry1] : He will remain on all of his medications, aspirin, Brilinta was d/c,  beta-blockers statins.  Because of his past history of statin intolerance I will start the process for prescribing Repatha.  which he now is taking. I  scheduled an echo  to reassess LV  function which showed restoration back to normal F/u in 6 months EKG today is unchanged He will follow-up with me in 3 to 4 months and hopefully try and lose some weight. He will get a repeat echo to assess LV function, He will remain on losartan 50 mg daily and Aldactone 25 mg daily. Because of an elevated hemoglobin A1c I added metformin 500 mg long-acting.

## 2024-06-13 NOTE — REASON FOR VISIT
[Hyperlipidemia] : hyperlipidemia [Hypertension] : hypertension [Coronary Artery Disease] : coronary artery disease [FreeTextEntry1] : I saw this 68-year-old man in follow-up consultation on  06/13/24 He presented to the hospital on 04/21/23 with an anterior wall myocardial infarction and had stenting of a complex LAD diagonal lesion.  He had a reduced EF, had 48 hours of reperfusion arrhythmias, but seems to have settled down on beta-blockers aspirin statins Brilinta and aspirin. Recent follow-up echo shows a normal ejection fraction of 75%. He comes in for follow-up with no complaints from a cardiac  point of view.  However he has had a hard time with an upper respiratory tract infection with a chronic cough lasting many weeks.  This seems to be resolving.  He has been inactive and put on 30 pounds in weight. He is concerned because he is short of breath and not feeling well and noticed that in his antihypertensive bottle there were 3 different shape size medications supposedly oral losartan. This seems to be straightened out and his blood pressure is good and he is off his duloxetine and seems to be able to sleep without it.

## 2024-07-09 ENCOUNTER — APPOINTMENT (OUTPATIENT)
Dept: CARDIOLOGY | Facility: CLINIC | Age: 69
End: 2024-07-09

## 2024-12-12 ENCOUNTER — NON-APPOINTMENT (OUTPATIENT)
Age: 69
End: 2024-12-12

## 2024-12-12 ENCOUNTER — APPOINTMENT (OUTPATIENT)
Dept: CARDIOLOGY | Facility: CLINIC | Age: 69
End: 2024-12-12
Payer: MEDICARE

## 2024-12-12 VITALS
SYSTOLIC BLOOD PRESSURE: 138 MMHG | WEIGHT: 270 LBS | HEART RATE: 66 BPM | BODY MASS INDEX: 33.57 KG/M2 | DIASTOLIC BLOOD PRESSURE: 88 MMHG | HEIGHT: 75 IN | OXYGEN SATURATION: 100 %

## 2024-12-12 DIAGNOSIS — T46.6X5A ADVERSE EFFECT OF ANTIHYPERLIPIDEMIC AND ANTIARTERIOSCLEROTIC DRUGS, INITIAL ENCOUNTER: ICD-10-CM

## 2024-12-12 DIAGNOSIS — C61 MALIGNANT NEOPLASM OF PROSTATE: ICD-10-CM

## 2024-12-12 DIAGNOSIS — Z95.5 PRESENCE OF CORONARY ANGIOPLASTY IMPLANT AND GRAFT: ICD-10-CM

## 2024-12-12 DIAGNOSIS — I25.2 OLD MYOCARDIAL INFARCTION: ICD-10-CM

## 2024-12-12 DIAGNOSIS — E11.9 TYPE 2 DIABETES MELLITUS W/OUT COMPLICATIONS: ICD-10-CM

## 2024-12-12 DIAGNOSIS — E78.5 HYPERLIPIDEMIA, UNSPECIFIED: ICD-10-CM

## 2024-12-12 DIAGNOSIS — N30.40 IRRADIATION CYSTITIS W/OUT HEMATURIA: ICD-10-CM

## 2024-12-12 PROCEDURE — 99215 OFFICE O/P EST HI 40 MIN: CPT

## 2024-12-12 PROCEDURE — G2211 COMPLEX E/M VISIT ADD ON: CPT

## 2024-12-12 PROCEDURE — 93000 ELECTROCARDIOGRAM COMPLETE: CPT

## 2024-12-18 ENCOUNTER — OFFICE (OUTPATIENT)
Dept: URBAN - METROPOLITAN AREA CLINIC 38 | Facility: CLINIC | Age: 69
Setting detail: OPHTHALMOLOGY
End: 2024-12-18
Payer: MEDICARE

## 2024-12-18 DIAGNOSIS — H02.825: ICD-10-CM

## 2024-12-18 PROCEDURE — 67840 REMOVE EYELID LESION: CPT | Mod: E2 | Performed by: STUDENT IN AN ORGANIZED HEALTH CARE EDUCATION/TRAINING PROGRAM

## 2024-12-18 PROCEDURE — 92285 EXTERNAL OCULAR PHOTOGRAPHY: CPT | Performed by: STUDENT IN AN ORGANIZED HEALTH CARE EDUCATION/TRAINING PROGRAM

## 2024-12-18 ASSESSMENT — CONFRONTATIONAL VISUAL FIELD TEST (CVF)
OD_FINDINGS: FULL
OS_FINDINGS: FULL

## 2024-12-18 ASSESSMENT — VISUAL ACUITY
OD_BCVA: 20/25-2
OS_BCVA: 20/20

## 2025-02-24 ENCOUNTER — RX RENEWAL (OUTPATIENT)
Age: 70
End: 2025-02-24

## 2025-05-13 ENCOUNTER — NON-APPOINTMENT (OUTPATIENT)
Age: 70
End: 2025-05-13

## 2025-05-14 ENCOUNTER — NON-APPOINTMENT (OUTPATIENT)
Age: 70
End: 2025-05-14

## 2025-05-14 ENCOUNTER — APPOINTMENT (OUTPATIENT)
Dept: CARDIOLOGY | Facility: CLINIC | Age: 70
End: 2025-05-14
Payer: MEDICARE

## 2025-05-14 VITALS
BODY MASS INDEX: 33.82 KG/M2 | SYSTOLIC BLOOD PRESSURE: 114 MMHG | WEIGHT: 272 LBS | DIASTOLIC BLOOD PRESSURE: 70 MMHG | HEIGHT: 75 IN | OXYGEN SATURATION: 98 % | HEART RATE: 69 BPM

## 2025-05-14 DIAGNOSIS — T46.6X5A ADVERSE EFFECT OF ANTIHYPERLIPIDEMIC AND ANTIARTERIOSCLEROTIC DRUGS, INITIAL ENCOUNTER: ICD-10-CM

## 2025-05-14 DIAGNOSIS — Z95.5 PRESENCE OF CORONARY ANGIOPLASTY IMPLANT AND GRAFT: ICD-10-CM

## 2025-05-14 DIAGNOSIS — E78.5 HYPERLIPIDEMIA, UNSPECIFIED: ICD-10-CM

## 2025-05-14 DIAGNOSIS — I25.2 OLD MYOCARDIAL INFARCTION: ICD-10-CM

## 2025-05-14 DIAGNOSIS — E11.9 TYPE 2 DIABETES MELLITUS W/OUT COMPLICATIONS: ICD-10-CM

## 2025-05-14 PROCEDURE — G2211 COMPLEX E/M VISIT ADD ON: CPT

## 2025-05-14 PROCEDURE — 93000 ELECTROCARDIOGRAM COMPLETE: CPT

## 2025-05-14 PROCEDURE — 99215 OFFICE O/P EST HI 40 MIN: CPT

## 2025-08-22 ENCOUNTER — RX RENEWAL (OUTPATIENT)
Age: 70
End: 2025-08-22

## 2025-08-27 ENCOUNTER — RESULT REVIEW (OUTPATIENT)
Age: 70
End: 2025-08-27

## 2025-09-02 ENCOUNTER — TRANSCRIPTION ENCOUNTER (OUTPATIENT)
Age: 70
End: 2025-09-02

## 2025-09-09 ENCOUNTER — APPOINTMENT (OUTPATIENT)
Dept: CARDIOLOGY | Facility: CLINIC | Age: 70
End: 2025-09-09
Payer: MEDICARE

## 2025-09-09 VITALS
DIASTOLIC BLOOD PRESSURE: 74 MMHG | BODY MASS INDEX: 33.45 KG/M2 | HEART RATE: 62 BPM | WEIGHT: 269 LBS | HEIGHT: 75 IN | SYSTOLIC BLOOD PRESSURE: 130 MMHG | OXYGEN SATURATION: 98 %

## 2025-09-09 DIAGNOSIS — E78.5 HYPERLIPIDEMIA, UNSPECIFIED: ICD-10-CM

## 2025-09-09 DIAGNOSIS — C61 MALIGNANT NEOPLASM OF PROSTATE: ICD-10-CM

## 2025-09-09 DIAGNOSIS — Z95.5 PRESENCE OF CORONARY ANGIOPLASTY IMPLANT AND GRAFT: ICD-10-CM

## 2025-09-09 DIAGNOSIS — N30.40 IRRADIATION CYSTITIS W/OUT HEMATURIA: ICD-10-CM

## 2025-09-09 DIAGNOSIS — I25.2 OLD MYOCARDIAL INFARCTION: ICD-10-CM

## 2025-09-09 DIAGNOSIS — T46.6X5A ADVERSE EFFECT OF ANTIHYPERLIPIDEMIC AND ANTIARTERIOSCLEROTIC DRUGS, INITIAL ENCOUNTER: ICD-10-CM

## 2025-09-09 DIAGNOSIS — E11.9 TYPE 2 DIABETES MELLITUS W/OUT COMPLICATIONS: ICD-10-CM

## 2025-09-09 PROCEDURE — G2211 COMPLEX E/M VISIT ADD ON: CPT

## 2025-09-09 PROCEDURE — 99215 OFFICE O/P EST HI 40 MIN: CPT

## 2025-09-12 RX ORDER — TIRZEPATIDE 2.5 MG/.5ML
2.5 INJECTION, SOLUTION SUBCUTANEOUS
Qty: 4 | Refills: 1 | Status: ACTIVE | COMMUNITY
Start: 2025-09-12 | End: 1900-01-01

## 2025-09-12 RX ORDER — TIRZEPATIDE 2.5 MG/.5ML
2.5 INJECTION, SOLUTION SUBCUTANEOUS
Qty: 3 | Refills: 3 | Status: DISCONTINUED | COMMUNITY
Start: 2025-09-09 | End: 2025-09-12

## 2025-09-20 LAB — A1CG - A1C WITH ESTIMATED AVERAGE GLUCOSE: 6.2

## (undated) DEVICE — PACK CYSTOSCOPY TIBURON

## (undated) DEVICE — SOL IRR BAG NS 0.9% 3000ML

## (undated) DEVICE — WARMING BLANKET UPPER ADULT

## (undated) DEVICE — SOL IRR BAG H2O 3000ML

## (undated) DEVICE — GLV 8 PROTEXIS (WHITE)

## (undated) DEVICE — IRR BULB PATHFINDER + 10"

## (undated) DEVICE — FOLEY CATH 3-WAY 24FR 30CC LATEX LUBRICATH

## (undated) DEVICE — VENODYNE/SCD SLEEVE CALF LARGE

## (undated) DEVICE — GOWN XXL

## (undated) DEVICE — TUBING TUR 2 PRONG

## (undated) DEVICE — ELCTR PLASMA LOOP MEDIUM ANGLED 24FR 12-30 DEG

## (undated) DEVICE — FOLEY TRAY 16FR 5CC LTX UMETER CLOSED

## (undated) DEVICE — FOLEY HOLDER STATLOCK 2 WAY ADULT

## (undated) DEVICE — PORT BIOPSY

## (undated) DEVICE — DRAPE C ARM UNIVERSAL

## (undated) DEVICE — SOL IRR POUR H2O 1500ML

## (undated) DEVICE — DRAPE EQUIPMENT BANDED BAG 30 X 30" (SHOWER CAP)